# Patient Record
Sex: FEMALE | Race: WHITE | NOT HISPANIC OR LATINO | Employment: FULL TIME | URBAN - METROPOLITAN AREA
[De-identification: names, ages, dates, MRNs, and addresses within clinical notes are randomized per-mention and may not be internally consistent; named-entity substitution may affect disease eponyms.]

---

## 2017-07-19 ENCOUNTER — OFFICE VISIT (OUTPATIENT)
Dept: SPORTS MEDICINE | Facility: CLINIC | Age: 52
End: 2017-07-19
Payer: COMMERCIAL

## 2017-07-19 VITALS — TEMPERATURE: 98 F | WEIGHT: 180 LBS | BODY MASS INDEX: 31.89 KG/M2 | HEIGHT: 63 IN

## 2017-07-19 DIAGNOSIS — S46.812A TRAPEZIUS STRAIN, LEFT, INITIAL ENCOUNTER: ICD-10-CM

## 2017-07-19 DIAGNOSIS — M70.61 GREATER TROCHANTERIC BURSITIS OF RIGHT HIP: ICD-10-CM

## 2017-07-19 DIAGNOSIS — M54.2 CERVICAL SPINE PAIN: Primary | ICD-10-CM

## 2017-07-19 PROCEDURE — 20610 DRAIN/INJ JOINT/BURSA W/O US: CPT | Mod: RT,S$GLB,, | Performed by: FAMILY MEDICINE

## 2017-07-19 PROCEDURE — 99203 OFFICE O/P NEW LOW 30 MIN: CPT | Mod: 25,S$GLB,, | Performed by: FAMILY MEDICINE

## 2017-07-19 PROCEDURE — 99999 PR PBB SHADOW E&M-EST. PATIENT-LVL III: CPT | Mod: PBBFAC,,, | Performed by: FAMILY MEDICINE

## 2017-07-19 RX ORDER — MELOXICAM 15 MG/1
15 TABLET ORAL DAILY
Qty: 15 TABLET | Refills: 0 | Status: SHIPPED | OUTPATIENT
Start: 2017-07-19 | End: 2022-02-03 | Stop reason: SDUPTHER

## 2017-07-19 RX ADMIN — TRIAMCINOLONE ACETONIDE 40 MG: 40 INJECTION, SUSPENSION INTRA-ARTICULAR; INTRAMUSCULAR at 08:07

## 2017-07-19 NOTE — PROGRESS NOTES
Beverley Porter, a 52 year old female, presents today for evaluation of CERVICAL spine pain.     New patient.     History of Present Illness     Location: cervical spine  Onset: insidious,   Palliative:    Relative rest     Provocative: prolonged ambulation  Prior: none  Progression: plateau discomfort  Quality: sharp pain  Radiation: Yes  Severity: per nursing documentation  Timing: intermittent w/ use  Trauma: none    Physical Examination (PE):    Constitutional:     -Vital signs: reviewed   -General appearance: no apparent distress  [Dermatalogic] Skin:    -Inspection: No acute lesions, ulceration, or induration of the skin noted about the area evaluated   -Palpation: No subcutaneous crepitus noted about the area evaluated  Musculoskeletal (o, p, r, st): CERVICAL SPINE    There is no step off with palpation of spinous processes.  Flexion does not produce local or radicular pain.  Extension DOES produce local pain, though not radicular pain.  Spurlings test is negative to both the right and left sides  Lateral flexion/bending is symmetric and normal and does not produce local or radicular pain.  Right and left rotation is symmetric and normal and does not produce local or radicular pain.    Neruologic examination:  Strength:     BUE 5/5  Sensation is symmetric in the UE bilaterally.    Cardiovascular:   -BUE warm and well perfused   Neurologic:   -BUE sensation is appropriate     AAFP/FPM: Pocket Guide Documentation Guidelines, (c)2014    (71038=25+ bullets, 96408=81+ bullets)    Assessment:   #1 multi-level degenerative changes of the lumbar spine    No evidence of cord pathology / myelopathy  No evidence of neurologic pathology  No evidence of vascular pathology    Imaging studies reviewed:   Refused cervical spine x-rays  MRI cervical spine, O[facility] 17.03    Plan:    We discussed options including:  #1 watchful waiting  #2 physical therapy aimed at:   RoM cervical spine   Strengthening cervical spine  #3 injection  therapy:   Trigger point   CSI facet joints   CSI epidural  #4 Consultation to [facility]      The patient chooses #2 and #3 trigger point    Pain management required: handout given  Bracing required:   Physical therapy required: fPT, @ [facility], begin as above   Activity (e.g. 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    Follow up in 6-8 w  A/e fPT  A/e trigger point injection  Should symptoms worsen or fail to resolve, consider:  Revisiting the above options    Injection(s), single to multiple trigger point(s) one or two muscles (36005 + 63060):    Date/Time: concurrent with visit  Performed by: PATRICIA ZAMORA MD  Authorized by: PATRICIA ZAMORA MD  Consent Done?: Yes (Verbal)  Indications: Pain  Procedure site marked: Yes   Timeout: Prior to procedure the correct patient, procedure, and site were verified      Location: superior left trapezius  Site:   Prep: Patient was prepped and draped in usual sterile fashion   Ultrasonic Guidance for needle placement: No  Needle size: 22 G  Approach: superior posterior  Medications: 40 mg triamcinolone acetonide 40 mg/mL; 40 mg triamcinolone acetonide 40 mg/mL per injection  Patient tolerance: Patient tolerated the procedure well with no immediate complications    Procedure: Up to 1.0 milliliter(s) of a 1:1 triamcinolone:1% lidocaine was introduced into the identified trigger site(s)    References:  2016.11.10 Epic [facility] quick procedure injection template  2012 Injection Techniques In Musculoskeletal Medicine, Maty Godoy, p. 68

## 2017-07-19 NOTE — Clinical Note
July 20, 2017      [facility]            [facility]  [address]  [facility] [address]  Phone: [phone]          Patient: Beverley Porter   MR Number: [id number]   YOB: 1965   Date of Visit: 7/19/2017       Dear [facility] :    Thank you for referring Beverley Porter to me for evaluation. Attached you will find relevant portions of my assessment and plan of care.    If you have questions, please do not hesitate to call me. I look forward to following Beverley Porter along with you.    Sincerely,    Shawn Anthony MD    Enclosure  CC:  No Recipients    If you would like to receive this communication electronically, please contact [email or web address] or [phone] to request more information on [employer] access.    For providers and/or their staff who would like to refer a patient to [facility], please contact us through our one-stop-shop provider referral line, [facility], at [phone].    If you feel you have received this communication in error or would no longer like to receive these types of communications, please e-mail [email or web address]

## 2017-07-20 RX ORDER — TRIAMCINOLONE ACETONIDE 40 MG/ML
40 INJECTION, SUSPENSION INTRA-ARTICULAR; INTRAMUSCULAR
Status: DISCONTINUED | OUTPATIENT
Start: 2017-07-19 | End: 2017-07-19 | Stop reason: HOSPADM

## 2017-07-20 NOTE — PROGRESS NOTES
Beverley Porter, a 52 y.o. female, is here for evaluation of right hip.     HISTORY OF PRESENT ILLNESS   Location: lateral thigh, right  Onset: insidious, chronic  Palliative:    Relative rest   Oral analgesics     Provocative:   ADLs     Prior: none  Progression: plateau discomfort   Quality:    Sharp pain  Radiation: none  Severity: per nursing documentation  Timing: intermittent with use  Trauma: none     Review of systems (ROS):  A 10+ review of systems was performed with pertinent positives and negatives noted above in the history of present illness. Other systems were negative unless otherwise specified.      PHYSICAL EXAMINATION  General:  The patient is alert and oriented x 3.  Mood is pleasant.  Observation of ears, eyes and nose reveal no gross abnormalities.  HEENT: NCAT, sclera nonicteric  Lungs: Respirations are equal and unlabored.   Gait is coordinated. Patient can toe walk and heel walk without difficulty.    HIP/PELVIS EXAMINATION    Observation/Inspection  Gait:   Nonantalgic   Alignment:  Neutral   Scars:   None   Muscle atrophy: None   Effusion:  None   Warmth:  None   Discoloration:   None   Leg lengths:   Equal   Pelvis:   Level     Tenderness/Crepitus (T/C):      T / C  Trochanteric bursa   - / -  Piriformis    - / -  SI joint    - / -  Psoas tendon   - / -  Rectus insertion  - / -  Adductor insertion  - / -  Pubic symphysis  - / -    ROM: (* = pain)    Flexion:      120 degrees  External rotation:   40 degrees  Internal rotation with axial load:  30 degrees  Internal rotation without axial load:  40 degrees  Abduction:    45 degrees  Adduction:     20 degrees    Special Tests:  Pain w/ forced internal rotation (FADIR):  -   Pain w/ forced external rotation (DONALD):  -   Circumduction test:     -  Stinchfield test:     -   Log roll:       -   Snapping hip (internal):    -   Sit-up pain:      -   Resisted sit-up pain:     -   Resisted sit-up with adductor contraction pain:  -   Step-down  test:     +  Trendelenburg test:     -  Bridge test      +     Extremity Neuro-vascular Examination:   Sensation:  Grossly intact to light touch all dermatomal regions.   Motor Function:  Fully intact motor function at hip, knee, foot and ankle    DTRs;  quadriceps and  achilles 2+.  No clonus and downgoing Babinski.    Vascular status:  DP and PT pulses 2+, brisk capillary refill, symmetric.    Skin:  intact, compartments soft.    Other Findings:    ASSESSMENT & PLAN  Assessment:   #1 Tonnis Grade xxx osteoarthritis of hip, right   W/ greater trochanteric bursitis    No evidence of neurologic pathology  No evidence of vascular pathology    Imaging studies reviewed:   X-ray pelvis and hip, right NONE    Plan:    We discussed the importance of appropriate diet, weight, and regular exercise including quadriceps strengthening     We discussed options including:  #1 watchful waiting  #2 physical therapy aimed at:   Core stability   RoM hip   Strengthening quadriceps   Gait training   #3 injection therapy:   CSI GTB    Right,     Left,    CSI iaHip    Right,     Left,    Orthobiologics   #4 MRI for further evaluation      The patient chooses #3 csi gtb right    Pain management: handout given  Bracing:   Physical therapy:   Activity (e.g. 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

## 2017-07-20 NOTE — PROCEDURES
Large Joint Aspiration/Injection  Date/Time: 7/19/2017 8:00 AM  Performed by: PATRICIA ZAMORA  Authorized by: PATRICIA ZAMORA     Consent Done?:  Yes (Verbal)  Indications:  Pain  Procedure site marked: Yes    Timeout: Prior to procedure the correct patient, procedure, and site was verified      Location:  Hip  Site:  R greater trochanteric bursa  Prep: Patient was prepped and draped in usual sterile fashion    Ultrasonic Guidance for needle placement: No  Needle size:  22 G  Approach:  Lateral  Medications:  40 mg triamcinolone acetonide 40 mg/mL  Patient tolerance:  Patient tolerated the procedure well with no immediate complications

## 2017-07-27 ENCOUNTER — CLINICAL SUPPORT (OUTPATIENT)
Dept: REHABILITATION | Facility: HOSPITAL | Age: 52
End: 2017-07-27
Attending: FAMILY MEDICINE
Payer: COMMERCIAL

## 2017-07-27 DIAGNOSIS — M54.2 NECK PAIN: Primary | ICD-10-CM

## 2017-07-27 PROCEDURE — 97161 PT EVAL LOW COMPLEX 20 MIN: CPT | Mod: PN | Performed by: PHYSICAL THERAPIST

## 2017-07-27 RX ORDER — TRIAMCINOLONE ACETONIDE 40 MG/ML
40 INJECTION, SUSPENSION INTRA-ARTICULAR; INTRAMUSCULAR
Status: DISCONTINUED | OUTPATIENT
Start: 2017-07-19 | End: 2017-10-20

## 2017-07-27 NOTE — PROGRESS NOTES
TIME RECORD    Date: 07/27/2017    Start Time:  1530  Stop Time:  1515    OUTPATIENT PHYSICAL THERAPY   PATIENT EVALUATION  Primary Diagnosis: neck pain  Treatment Diagnosis: poor postural awareness, periscapular weakness, decreased soft tissue mobility   Past Medical History:   Diagnosis Date    Genital herpes, unspecified      Past Surgical History:   Procedure Laterality Date    DILATION AND CURETTAGE OF UTERUS      ENDOMETRIAL ABLATION       Precautions: standard  Prior Therapy: yes  Medications: Beverley Porter has a current medication list which includes the following prescription(s): citalopram, meloxicam, valacyclovir, and valacyclovir, and the following Facility-Administered Medications: triamcinolone acetonide.  Nutrition:  Normal  Prior Level of Function: Independent  Social History: ; teach 's ed  Functional Deficits Leading to Referral/Nature of Injury:   Difficulty with turning head while driving and computer work  Patient Therapy Goals: decreased pain    Subjective     Beverley Porter states onset of neck pain approximately 1-2 months with no known cause. Pain is located in back of her neck and between her shoulder blades, L side > R. She also complains of headaches at base of skull. No light or sound sensitivity. Pt was also recommended PT for hip bursitis, but requests to defer hip eval at this time. She received an injections at last 's visit and reports improvements in symptoms. No blurry vision, N/V, drop attacks.     Pain:  Location: neck   Description: Sharp  Activities Which Increase Pain: turning her head  Activities Which Decrease Pain: not driving, pain medication  Pain Scale: 0/10 at best 3/10 now  8/10 at worst    Objective     Posture: protracted and anterior tilt of scapulae, lower cervical flexed  Palpation: TTP L upper trap, levator scap, R suboccipital region and rhomboids   Sensation: BUE light touch sensation intact all dermatomes    Range of Motion/Strength:       Cervical Spine Active ROM, measured in degrees with inclinometer, * Indicates pain with movement    Flexion: 40*  Extension:40*  Left Side Bend:30  Right Side Bend:30  Left Rotation:35*  Right Rotation:30*    PROM noted to be WFL  B shoulder AROM noted to be WFL  Joint mobility: hypomobility through C7-T3 and T8-12 central PA's    MMT:    Left  Right    Shoulder:       Flexion:   5/5  5/5     Abduction:   4+/5  4+/5  External Rotation:  4+/5  5/5    Internal Rotation:  5/5  5/5    Elbow:  Flexion:    5/5  5/5  Extension:   5/5  5/5    Wrist:  Extension:   5/5  5/5    Finger:  Abd/add   5/5  5/5    Scapular:  Lower traps   4-/5  4-/5  Middle traps   4-/5  4/5  Rhomboids   4/5  4/5    Flexibility: tightness noted through upper traps, levator scap, pec minor  Gait: Without AD  Analysis: Assistance independent  Bed Mobility:Independent  Transfers: Independent  Special Tests:   Spurling's: negative  ULTT: negative    Treatment: Treatment deferred to next session secondary to pt request     Assessment       Initial Assessment Beverley is a 52 year old female referred to physical therapy for diagnosis of neck pain. Patient has the following impairments upon initial evaluation: periscapular weakness, poor postural awareness, decreased cervical spine AROM, joint stiffness, and pain limiting ADL's. Patient to benefit from skilled physical therapy to address above deficits and maximize functional independence. Patient appears motivated to improve condition and is a good candidate for physical therapy. Patient has verbalized understanding of plan of care and set goals. Patient has no identified cultural, spiritual, or educational needs that would impair learning.     History  Co-morbidities and personal factors that may impact the plan of care Examination  Body Structures and Functions, activity limitations and participation restrictions that may impact the plan of care Clinical Presentation   Decision Making/ Complexity Score    Co-morbidities:       BMI.            Personal Factors:    Body Regions:neck    Body Systems:     Musculoskeletal/ Neuromuscular:  weakness, impaired functional mobility, decreased safety awareness, pain, decreased ROM and impaired muscle length    Activity limitations: reading, driving, deskwork, turning head, looking down      Participation Restrictions: vocational tasks         stable and uncomplicated.     Low     CMS Impairment/Limitation/Restriction for FOTO Neck Survey  Status Limitation G-Code CMS Severity Modifier  Intake 58% 42% Current Status CK - At least 40 percent but less than 60 percent  Predicted 72% 28% Goal Status+ CJ - At least 20 percent but less than 40 percent    CMS Impairment/Limitation/Restriction for FOTO Hip Survey  Status Limitation G-Code CMS Severity Modifier  Intake 73% 27% Current Status CJ - At least 20 percent but less than 40 percent  Predicted 76% 24% Goal Status+ CJ - At least 20 percent but less than 40 percent    Rehab Potiential: good    Short Term Goals (4 Weeks):   1. Patient to demonstrate understanding of proper sitting posture and desk place ergonomics   2. Patient's cervical spine active rotation to improve 10 degrees or greater B  3. Patient's cervical spine active flexion to improve 10 degrees or greater  4. Patient to report decreased pain in neck by 40% or greater   Long Term Goals (8 Weeks):   1. Patient to have decreased subjective report of disability as noted by a score of 30% or less on the Neck FOTO questionnaire   2. Patient to be independent with home exercise program for improved self management of condition  3. Patient's periscapular strength to be 4+/5 or greater for improved functional ability to lift  4. Patient to report little to no difficulty turning head while driving for improved performance of vocational tasks    Plan     Certification Period: 7/27/17 to 9/27/17  Recommended Treatment Plan: 2 times per week for 8 weeks: Gait Training, Manual  Therapy, Moist Heat/ Ice, Neuromuscular Re-ed, Patient Education, Therapeutic Activites, Therapeutic Exercise and Other modalities/ dry needling PRN  Other Recommendations: Patient to start home program and told to contact therapist if there are any questions or concerns that arise prior to next scheduled visit      Therapist: Roula Frazier, PT

## 2017-08-04 ENCOUNTER — TELEPHONE (OUTPATIENT)
Dept: SPORTS MEDICINE | Facility: CLINIC | Age: 52
End: 2017-08-04

## 2017-08-07 ENCOUNTER — TELEPHONE (OUTPATIENT)
Dept: SPORTS MEDICINE | Facility: CLINIC | Age: 52
End: 2017-08-07

## 2017-08-07 PROBLEM — N95.0 POSTMENOPAUSAL BLEEDING: Status: ACTIVE | Noted: 2017-08-07

## 2017-08-07 NOTE — TELEPHONE ENCOUNTER
1st attempt.     Left voicemail.    08/22/17 appt needs to be r/s due to change in Dr. Atnhony's schedule.

## 2017-08-08 ENCOUNTER — TELEPHONE (OUTPATIENT)
Dept: SPORTS MEDICINE | Facility: CLINIC | Age: 52
End: 2017-08-08

## 2017-08-11 ENCOUNTER — TELEPHONE (OUTPATIENT)
Dept: SPORTS MEDICINE | Facility: CLINIC | Age: 52
End: 2017-08-11

## 2017-08-11 NOTE — TELEPHONE ENCOUNTER
2nd attempt.     Left voicemail.    08/22/17 appt needs to be r/s due to change in Dr. Anthony's schedule.

## 2017-08-15 ENCOUNTER — CLINICAL SUPPORT (OUTPATIENT)
Dept: REHABILITATION | Facility: HOSPITAL | Age: 52
End: 2017-08-15
Attending: FAMILY MEDICINE
Payer: COMMERCIAL

## 2017-08-15 DIAGNOSIS — M54.2 NECK PAIN: Primary | ICD-10-CM

## 2017-08-15 PROCEDURE — 97110 THERAPEUTIC EXERCISES: CPT | Mod: PN

## 2017-08-15 PROCEDURE — 97140 MANUAL THERAPY 1/> REGIONS: CPT | Mod: 59,PN

## 2017-08-15 NOTE — PROGRESS NOTES
Physical Therapy Daily Note     Name: Beverley Porter  Clinic Number: [id number]  Diagnosis:   Encounter Diagnosis   Name Primary?    Neck pain Yes     Physician: Shawn Anthony, *  Precautions: standard  Visit #: 2 of 50  PTA Visit #: 1    Time In: 1600  Time Out: 1700  Total Treatment Time: 60 minutes (1:1 with PTA 40 minutes of treatment session)    Subjective     Pt reports: Beverley states that she was scheduled to see her MD but she canceled that appointment. Patient states her hip is feeling better but her neck is still a little sore. Patient states the neck is more troublesome than the hip and she often gets pain when turning her head when driving.   Pain Scale: Beverley rates pain on a scale of 0-10 to be 3 currently.    Objective     Beverley received individual therapeutic exercises to develop strength, endurance, ROM, flexibility, posture and core stabilization for 40 minutes including:  Nu step x 7 minutes   Seated scapular retractions 2x10, 3 sec hold  Seated BUE ER 2x10, 3 sec hold  Supine AROM--cervical rotation x 2 minutes   Supine chin tucks 2x10, 3 sec hold   SL open books 1x10, 3 sec hold   Standing rows (RTB) 2x10, 3 sec hold   Standing extensions (RTB)  2x10, 3 sec hold     Beverley received the following manual therapy techniques: Myofacial release and Soft tissue Mobilization were applied to the: left upper trap for 10 minutes including:  Manual chin tucks, suboccipital release  Trigger point release to left upper trap     Beverley received moist heat to cervical spine x 10 minutes post treatment session.     Written Home Exercises Provided: Instructed patient in performance of scapular retractions 2-3 times a day.   Pt demo good understanding of the education provided. Beverley demonstrated good return demonstration of activities.     Education provided re:Beverley verbalized good understanding of education provided.   No spiritual or educational barriers to learning  provided    Assessment     Beverley tolerated treatment well today. Patient demonstrates decreased tissue mobility through proximal left upper trap with palpable trigger point and taught bands noted. Patient demonstrates ability to achieve good scapular retraction without upper trap compensation.   This is a 52 y.o. female referred to outpatient physical therapy and presents with a medical diagnosis of neck pain and demonstrates limitations as described in the problem list. Pt prognosis is Good. Pt will continue to benefit from skilled outpatient physical therapy to address the deficits listed in the problem list, provide pt/family education and to maximize pt's level of independence in the home and community environment.     Goals as follows:  Short Term Goals (4 Weeks):   1. Patient to demonstrate understanding of proper sitting posture and desk place ergonomics   2. Patient's cervical spine active rotation to improve 10 degrees or greater B  3. Patient's cervical spine active flexion to improve 10 degrees or greater  4. Patient to report decreased pain in neck by 40% or greater   Long Term Goals (8 Weeks):   1. Patient to have decreased subjective report of disability as noted by a score of 30% or less on the Neck FOTO questionnaire   2. Patient to be independent with home exercise program for improved self management of condition  3. Patient's periscapular strength to be 4+/5 or greater for improved functional ability to lift  4. Patient to report little to no difficulty turning head while driving for improved performance of vocational tasks     Plan     Continue with established Plan of Care towards PT goals.    Therapist: Balbina Sosa, PTA  8/15/2017

## 2017-09-12 ENCOUNTER — CLINICAL SUPPORT (OUTPATIENT)
Dept: REHABILITATION | Facility: HOSPITAL | Age: 52
End: 2017-09-12
Attending: FAMILY MEDICINE
Payer: COMMERCIAL

## 2017-09-12 DIAGNOSIS — M54.2 NECK PAIN: Primary | ICD-10-CM

## 2017-09-12 PROCEDURE — 97110 THERAPEUTIC EXERCISES: CPT | Mod: PN | Performed by: PHYSICAL THERAPIST

## 2017-09-12 PROCEDURE — 97140 MANUAL THERAPY 1/> REGIONS: CPT | Mod: PN | Performed by: PHYSICAL THERAPIST

## 2017-09-12 NOTE — PROGRESS NOTES
Physical Therapy Daily Note     Name: Beverley Porter  Clinic Number: [id number]  Diagnosis:   Encounter Diagnosis   Name Primary?    Neck pain Yes     Physician: Shawn Anthony, *  Precautions: standard  Visit #: 3 of 50  PTA Visit #: 0    Time In: 1430  Time Out: 1540  Total Treatment Time: 55 minutes     Subjective     Pt reports: Beverley states that her neck seems to be getting worse. She has not done the home exercise program and wants to be more consistent with coming to therapy. She complains of frequent tension headaches that start at base of neck and wrap around to temporal region.   Pain Scale: Beverley rates pain on a scale of 0-10 to be 3 currently.    Objective     Cervical Spine Active ROM, measured in degrees with inclinometer, * Indicates pain with movement    Flexion: 30*  Extension:40*  Left Side Bend:20  Right Side Bend:20  Left Rotation:40*  Right Rotation:35*    Beverley received individual therapeutic exercises to develop strength, endurance, ROM, flexibility, posture and core stabilization for 40 minutes including:  Nu step x 7 minutes - not performed   Seated upper thoracic extension hands concurrently with clapsed behind head and chin tuck 10 x  Seated scapular retractions 2x10, 3 sec hold  Seated BUE ER 2x10, 3 sec hold YTB  Supine AROM over Gianna roll--cervical rotation x 2 minutes   Supine chin tucks over gianna roll 2x10, 3 sec hold   SL open books 1x10, 3 sec hold   Standing rows (RTB) 2x10, 3 sec hold - not performed   Standing extensions (RTB)  2x10, 3 sec hold - not performed    Beverley received the following manual therapy techniques: Myofacial release and Soft tissue Mobilization were applied to the: left upper trap for 15 minutes including:  suboccipital release  Trigger point release to left upper trap   STM R rhomboids  MFR R pec minor  MET for left cspine rotation, side bending, flexion  kinesiotape Y strip to levator and I strip to upper  traps for inhibition     Beverley received moist heat to cervical spine and bolster under knees x 10 minutes post treatment session.     Written Home Exercises Provided: Instructed patient in performance of scapular retractions 2-3 times a day.   Pt demo good understanding of the education provided. Beverley demonstrated good return demonstration of activities.     Education provided re:Beverley verbalized good understanding of education provided.   No spiritual or educational barriers to learning provided    Assessment     Beverley tolerated treatment well with month reassessment performed. Pt has attended 2 treatment sessions and missed 2 visits. Pt demonstrating increased soft tissue restriction through cervical paraspinals, suboccipital region, upper traps, and levator scapulae. Decreased cervical spine AROM since initial evaluation due to pt being unable to attend physical therapy. Good response to manual techniques with improvements in soft tissue mobility and left cervical rotation following. Pt to benefit from continued skilled physical therapy to progress towards goals and maximize functional independence. This is a 52 y.o. female referred to outpatient physical therapy and presents with a medical diagnosis of neck pain and demonstrates limitations as described in the problem list. Pt prognosis is Good. Pt will continue to benefit from skilled outpatient physical therapy to address the deficits listed in the problem list, provide pt/family education and to maximize pt's level of independence in the home and community environment.     Goals as follows:  Short Term Goals (4 Weeks):   1. Patient to demonstrate understanding of proper sitting posture and desk place ergonomics (not met 9/12/2017)  2. Patient's cervical spine active rotation to improve 10 degrees or greater B(not met 9/12/2017)  3. Patient's cervical spine active flexion to improve 10 degrees or greater B(not met 9/12/2017)  4. Patient to report decreased pain in  neck by 40% or greater (not met 9/12/2017)  Long Term Goals (8 Weeks):   1. Patient to have decreased subjective report of disability as noted by a score of 30% or less on the Neck FOTO questionnaire   2. Patient to be independent with home exercise program for improved self management of condition  3. Patient's periscapular strength to be 4+/5 or greater for improved functional ability to lift  4. Patient to report little to no difficulty turning head while driving for improved performance of vocational tasks     Plan     Certification Period: 7/27/17 to 9/27/17  Continue with established Plan of Care towards PT goals.    Therapist: Roula Frazier, PT  9/12/2017

## 2017-09-15 ENCOUNTER — TELEPHONE (OUTPATIENT)
Dept: REHABILITATION | Facility: HOSPITAL | Age: 52
End: 2017-09-15

## 2017-09-19 ENCOUNTER — CLINICAL SUPPORT (OUTPATIENT)
Dept: REHABILITATION | Facility: HOSPITAL | Age: 52
End: 2017-09-19
Attending: FAMILY MEDICINE
Payer: COMMERCIAL

## 2017-09-19 DIAGNOSIS — M54.2 NECK PAIN: Primary | ICD-10-CM

## 2017-09-19 PROCEDURE — 97110 THERAPEUTIC EXERCISES: CPT | Mod: PN | Performed by: PHYSICAL THERAPIST

## 2017-09-19 PROCEDURE — 97140 MANUAL THERAPY 1/> REGIONS: CPT | Mod: PN | Performed by: PHYSICAL THERAPIST

## 2017-09-19 NOTE — PROGRESS NOTES
"                                                    Physical Therapy Daily Note     Name: Beverley Porter  Clinic Number: [id number]  Diagnosis:   Encounter Diagnosis   Name Primary?    Neck pain Yes     Physician: Shawn Anthony, *  Precautions: standard  Visit #: 4 of 50  PTA Visit #: 0    Time In: 1425  Time Out: 1530  Total Treatment Time: 55 minutes     Subjective     Pt reports: Beverley states feeling fine after last treatment session but severe pain the next 3 days. She notes that it may also be stress. Her pain is from base of head into shoulders and front of neck. She is worried something else may be going on for her to be in this much pain.  Pain is worse when turning head to the left.   Pain Scale: Beverley rates pain on a scale of 0-10 to be 5 currently.    Objective     Beverley received individual therapeutic exercises to develop strength, endurance, ROM, flexibility, posture and core stabilization for 40 minutes including:  Nu step x 7 minutes - np  Seated upper thoracic extension hands concurrently with clapsed behind head and chin tuck 10 x  Seated scapular retractions 2x10, 3 sec hold  Supine BUE ER 2x10, 3 sec hold YTB  Supine AROM over Gianna roll--cervical rotation x 2 minutes - no performed 2/2 pain  Supine chin tucks over gianna roll 2x10, 3 sec hold   Supine pec stretch over 1/2 foam roll 10 x 10"  SL open books 1x10, 3 sec hold   Prone row's x10, 3 sec hold   Prone extensions "I"  x10, 3 sec hold   UT stretch 20" x 2  LS stretch 20" x 2    Beverley received the following manual therapy techniques: Myofacial release and Soft tissue Mobilization were applied to the: left upper trap for 15 minutes including:  suboccipital release  Trigger point release to left upper trap   Cross friction/ STM rhomboids Right  Grade II/III PA central mid to lower thoracic for improved extension    Beverley received moist heat to cervical spine and bolster under knees x 10 minutes at start of treatment session     Written Home " Exercises Provided: Instructed patient in performance of scapular retractions 2-3 times a day.   Pt demo good understanding of the education provided. Beverley demonstrated good return demonstration of activities.     Education provided re:Beverley verbalized good understanding of education provided.   No spiritual or educational barriers to learning provided    Assessment     Beverley with fair tolerance to treatment session with report of cramping along medial border of right scapula with performance of exercises. Good response to manual techniques with reduction in scapular pain following. Minimal response to manual trigger point release and pt is interested in functional dry needling if conservation methods fail. Pt to benefit from continued skilled physical therapy as tolerated to progress towards goals and maximize functional independence. This is a 52 y.o. female referred to outpatient physical therapy and presents with a medical diagnosis of neck pain and demonstrates limitations as described in the problem list. Pt prognosis is Good. Pt will continue to benefit from skilled outpatient physical therapy to address the deficits listed in the problem list, provide pt/family education and to maximize pt's level of independence in the home and community environment.     Goals as follows:  Short Term Goals (4 Weeks):   1. Patient to demonstrate understanding of proper sitting posture and desk place ergonomics (not met 9/12/2017)  2. Patient's cervical spine active rotation to improve 10 degrees or greater B(not met 9/12/2017)  3. Patient's cervical spine active flexion to improve 10 degrees or greater B(not met 9/12/2017)  4. Patient to report decreased pain in neck by 40% or greater (not met 9/12/2017)  Long Term Goals (8 Weeks):   1. Patient to have decreased subjective report of disability as noted by a score of 30% or less on the Neck FOTO questionnaire   2. Patient to be independent with home exercise program for improved  self management of condition  3. Patient's periscapular strength to be 4+/5 or greater for improved functional ability to lift  4. Patient to report little to no difficulty turning head while driving for improved performance of vocational tasks     Plan     Certification Period: 7/27/17 to 9/27/17  Continue with established Plan of Care towards PT goals.    Therapist: Roula Frazier, PT  9/19/2017

## 2017-09-21 ENCOUNTER — CLINICAL SUPPORT (OUTPATIENT)
Dept: REHABILITATION | Facility: HOSPITAL | Age: 52
End: 2017-09-21
Attending: FAMILY MEDICINE
Payer: COMMERCIAL

## 2017-09-21 DIAGNOSIS — M54.2 NECK PAIN: Primary | ICD-10-CM

## 2017-09-21 PROCEDURE — 97140 MANUAL THERAPY 1/> REGIONS: CPT | Mod: PN | Performed by: PHYSICAL THERAPIST

## 2017-09-21 PROCEDURE — 97110 THERAPEUTIC EXERCISES: CPT | Mod: PN | Performed by: PHYSICAL THERAPIST

## 2017-09-21 NOTE — PROGRESS NOTES
"                                                    Physical Therapy Daily Note     Name: Beverley Porter  Clinic Number: [id number]  Diagnosis:   Encounter Diagnosis   Name Primary?    Neck pain Yes     Physician: Shawn Anthony, *  Precautions: standard  Visit #: 4 of 50  PTA Visit #: 0    Time In: 1425  Time Out: 1530  Total Treatment Time: 55 minutes     Subjective     Pt reports: Beverley states feeling fine after last treatment session but severe pain the next 3 days. She notes that it may also be stress. Her pain is from base of head into shoulders and front of neck. She is worried something else may be going on for her to be in this much pain.  Pain is worse when turning head to the left.   Pain Scale: Beverley rates pain on a scale of 0-10 to be 5 currently.    Objective     Beverley received individual therapeutic exercises to develop strength, endurance, ROM, flexibility, posture and core stabilization for 40 minutes including:  Nu step x 6 minutes   Seated upper thoracic extension hands concurrently with clapsed behind head and chin tuck 10 x  Seated scapular retractions 2x10, 3 sec hold  Supine BUE ER 2x10, 3 sec hold YTB  Supine AROM over Gianna roll--cervical rotation x 2 minutes - no performed 2/2 pain  Supine chin tucks over gianna roll 2x10, 3 sec hold   Supine pec stretch over 1/2 foam roll 10 x 10"  SL open books 1x10, 3 sec hold   Prone row's x10, 3 sec hold   Prone extensions "I"  x10, 3 sec hold   UT stretch 20" x 2  LS stretch 20" x 2    Beverley received the following manual therapy techniques: Myofacial release and Soft tissue Mobilization were applied to the: left upper trap for 15 minutes including:  suboccipital release  Trigger point release to left upper trap   Cross friction/ STM rhomboids Right  Grade II/III PA central mid to lower thoracic for improved extension    Beverley received moist heat to cervical spine and bolster under knees x 10 minutes at start of treatment session     Written Home Exercises " Provided: Instructed patient in performance of scapular retractions 2-3 times a day.   Pt demo good understanding of the education provided. Beverley demonstrated good return demonstration of activities.     Education provided re:Beverley verbalized good understanding of education provided.   No spiritual or educational barriers to learning provided    Assessment     Beverley with improved tolerance to treatment and able to perform all prescribed exercises without exacerbation of pain. Pt with increased tenderness and pain through suboccipitals, cervical paraspinals, and levator scapulae. Pt to benefit from continued skilled physical therapy as tolerated to progress towards goals and maximize functional independence. This is a 52 y.o. female referred to outpatient physical therapy and presents with a medical diagnosis of neck pain and demonstrates limitations as described in the problem list. Pt prognosis is Good. Pt will continue to benefit from skilled outpatient physical therapy to address the deficits listed in the problem list, provide pt/family education and to maximize pt's level of independence in the home and community environment.     Goals as follows:  Short Term Goals (4 Weeks):   1. Patient to demonstrate understanding of proper sitting posture and desk place ergonomics (not met 9/12/2017)  2. Patient's cervical spine active rotation to improve 10 degrees or greater B(not met 9/12/2017)  3. Patient's cervical spine active flexion to improve 10 degrees or greater B(not met 9/12/2017)  4. Patient to report decreased pain in neck by 40% or greater (not met 9/12/2017)  Long Term Goals (8 Weeks):   1. Patient to have decreased subjective report of disability as noted by a score of 30% or less on the Neck FOTO questionnaire   2. Patient to be independent with home exercise program for improved self management of condition  3. Patient's periscapular strength to be 4+/5 or greater for improved functional ability to lift  4.  Patient to report little to no difficulty turning head while driving for improved performance of vocational tasks     Plan     Certification Period: 7/27/17 to 9/27/17  Continue with established Plan of Care towards PT goals.    Therapist: Roula Frazier, PT  9/21/2017

## 2017-09-26 ENCOUNTER — TELEPHONE (OUTPATIENT)
Dept: PAIN MEDICINE | Facility: CLINIC | Age: 52
End: 2017-09-26

## 2017-09-26 ENCOUNTER — OFFICE VISIT (OUTPATIENT)
Dept: SPORTS MEDICINE | Facility: CLINIC | Age: 52
End: 2017-09-26
Payer: COMMERCIAL

## 2017-09-26 VITALS — HEIGHT: 63 IN | WEIGHT: 183 LBS | BODY MASS INDEX: 32.43 KG/M2 | TEMPERATURE: 98 F

## 2017-09-26 DIAGNOSIS — M54.2 CERVICAL SPINE PAIN: Primary | ICD-10-CM

## 2017-09-26 PROCEDURE — 99999 PR PBB SHADOW E&M-EST. PATIENT-LVL III: CPT | Mod: PBBFAC,,, | Performed by: FAMILY MEDICINE

## 2017-09-26 PROCEDURE — 99214 OFFICE O/P EST MOD 30 MIN: CPT | Mod: S$GLB,,, | Performed by: FAMILY MEDICINE

## 2017-09-26 NOTE — TELEPHONE ENCOUNTER
----- Message from Bhumika Rasmussen MA sent at 9/26/2017  4:07 PM CDT -----  Good afternoon-    Dr. Anthony is referring pt to Dr. Escoto for possible cervical spine SHARMIN. Pt has MRI schedule 10/02/17.     Please contact pt directly for scheduling.     Thank you in advance.     Bhumika Rasmussen  Clinical Assistant to Shawn Anthony MD  [facility] [place]

## 2017-09-26 NOTE — PROGRESS NOTES
Beverley Porter, a 52 year old female, presents today for evaluation of CERVICAL spine pain.     History of Present Illness     Location: cervical spine  Onset: insidious,   Palliative:    Relative rest   CSI, left superior trapezius, 07/19/17, mild improvement but not for long    fPT @ [facility], moderate compliance  Provocative: prolonged ambulation  Prior: none  Progression: plateau discomfort  Quality: sharp pain  Radiation: Yes  Severity: per nursing documentation  Timing: intermittent w/ use  Trauma: none    Physical Examination (PE):    Constitutional:     -Vital signs: reviewed   -General appearance: no apparent distress  [Dermatalogic] Skin:    -Inspection: No acute lesions, ulceration, or induration of the skin noted about the area evaluated   -Palpation: No subcutaneous crepitus noted about the area evaluated  Musculoskeletal (o, p, r, st): CERVICAL SPINE    There is no step off with palpation of spinous processes.  Flexion does not produce local or radicular pain.  Extension DOES produce local pain, though not radicular pain.  Spurlings test is negative to both the right and left sides  Lateral flexion/bending is symmetric and normal and does not produce local or radicular pain.  Right and left rotation is symmetric and normal and does not produce local or radicular pain.    Neruologic examination:  Strength:     BUE 5/5  Sensation is symmetric in the UE bilaterally.    Cardiovascular:   -BUE warm and well perfused   Neurologic:   -BUE sensation is appropriate     AAFP/FPM: Pocket Guide Documentation Guidelines, (c)2014    (30159=94+ bullets, 63811=20+ bullets)    Assessment:   #1 concern for cervical spine nerve root impingement, left > right    No evidence of cord pathology / myelopathy  No evidence of vascular pathology    Imaging studies reviewed:   X-ray spine cervical, deferred by pt    Plan:    Given extreme dysfunction and discomfort, coupled with history and physical examination suggesting cervical  disc pathology, and with non-diagnostic x-ray imaging, we will obtain MRI imaging for further evaluation of the soft tissue structures of the cervical spine.    [We discussed options including:  #1 watchful waiting  #2 continue physical therapy aimed at:   RoM cervical spine   Strengthening cervical spine stabilizers   #3 injection therapy:   Trigger point    Left superior trapezius, effective modest%, repeat   CSI facet joints   CSI epidural  #4 consultation w/ [facility]  #5 re consultation w/ [facility] re: cervical epidural  #6 consultation w/ neurology re: occipital headaches      The patient chooses #2, #5]    Pain management required: handout given  Bracing required:   Physical therapy required: fPT, @ [facility], continue as above   Activity (e.g. 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

## 2017-09-26 NOTE — TELEPHONE ENCOUNTER
Contacted and left message for patient asking for a return call to schedule a office appointment.

## 2017-10-02 ENCOUNTER — TELEPHONE (OUTPATIENT)
Dept: SPORTS MEDICINE | Facility: CLINIC | Age: 52
End: 2017-10-02

## 2017-10-02 ENCOUNTER — CLINICAL SUPPORT (OUTPATIENT)
Dept: REHABILITATION | Facility: HOSPITAL | Age: 52
End: 2017-10-02
Attending: FAMILY MEDICINE
Payer: COMMERCIAL

## 2017-10-02 ENCOUNTER — HOSPITAL ENCOUNTER (OUTPATIENT)
Dept: RADIOLOGY | Facility: HOSPITAL | Age: 52
Discharge: HOME | End: 2017-10-02
Attending: FAMILY MEDICINE
Payer: COMMERCIAL

## 2017-10-02 DIAGNOSIS — M54.2 NECK PAIN: Primary | ICD-10-CM

## 2017-10-02 DIAGNOSIS — M54.2 CERVICAL SPINE PAIN: ICD-10-CM

## 2017-10-02 PROCEDURE — 97140 MANUAL THERAPY 1/> REGIONS: CPT | Mod: PN | Performed by: PHYSICAL THERAPIST

## 2017-10-02 PROCEDURE — 97110 THERAPEUTIC EXERCISES: CPT | Mod: PN | Performed by: PHYSICAL THERAPIST

## 2017-10-02 NOTE — PROGRESS NOTES
"                                                    Physical Therapy Daily Note     Name: Beverley Porter  Clinic Number: [id number]  Diagnosis:   Encounter Diagnosis   Name Primary?    Neck pain Yes     Physician: Shawn Anthony, *  Precautions: standard  Visit #: 5 of 50  PTA Visit #: 0    Time In: 1650  Time Out: 1745  Total Treatment Time: 55 minutes     Subjective     Pt reports: Beverley states follow up with sport medicine and continued PT recommended. She is also to schedule for an MRI and pain management. She reports pain at base of her head and it shoots all the way up and down her neck. She finds herself turning to look to the side with her eyes and afraid to turn her head because it might hurt. She lost her home exercise program and requests another copy today.   Pain Scale: Beverley rates pain on a scale of 0-10 to be 5 currently.    Objective     Beverley received individual therapeutic exercises to develop strength, endurance, ROM, flexibility, posture and core stabilization for 40 minutes including:  Nu step x 6 minutes   Seated upper thoracic extension hands concurrently with clapsed behind head and chin tuck 10 x- NP  Rhomboid stretch 5 x 10"  Seated scapular retractions 2x10, 3 sec hold  Supine BUE ER 2x10, 3 sec hold RTB  Supine shoulder abduction 2 x 10 RTB  Supine AROM over Cora roll--cervical rotation x 2 minutes - not performed 2/2 pain  Seated chin tucks "L"technique 2x10, 3 sec hold   Supine pec stretch over 1/2 foam roll 10 x 10"- NP  Corner stretch 5 x 10"  SL open books 1x10, 3 sec hold   Prone row's x10, 3 sec hold - NP  Prone extensions "I"  x10, 3 sec hold -NP  UT stretch 20" x 2  LS stretch 20" x 2    Beverley received the following manual therapy techniques: Myofacial release and Soft tissue Mobilization were applied to the: left upper trap for 15 minutes including:  suboccipital release  STM upper trap, levator scap, cervical paraspinals  Manual cervical distraction  PROM all planes    Beverley " received moist heat to cervical spine and bolster under knees x 10 minutes at start of treatment session     Written Home Exercises Provided: Issued updated HEP with LS/UT stretches, chin tucks, W's, T's with RTB, open books, and corner stretch  Pt demo good understanding of the education provided. Beverley demonstrated good return demonstration of activities.     Education provided re:Beverley verbalized good understanding of education provided.   No spiritual or educational barriers to learning provided    Assessment     Beverley tolerated treatment session well. Good response to manual distraction with reduction in posterior neck pain following. Pt requiring moderate verbal cueing for correct performance of therapeutic exercises and re-educated on home exercise program for self management of condition. Pt presents to PT with referral for continued physical therapy for neck pain and certification period updated.This is a 52 y.o. female referred to outpatient physical therapy and presents with a medical diagnosis of neck pain and demonstrates limitations as described in the problem list. Pt prognosis is Good. Pt will continue to benefit from skilled outpatient physical therapy to address the deficits listed in the problem list, provide pt/family education and to maximize pt's level of independence in the home and community environment.     Goals as follows:  Short Term Goals (4 Weeks):   1. Patient to demonstrate understanding of proper sitting posture and desk place ergonomics (not met 9/12/2017)  2. Patient's cervical spine active rotation to improve 10 degrees or greater B(not met 9/12/2017)  3. Patient's cervical spine active flexion to improve 10 degrees or greater B(not met 9/12/2017)  4. Patient to report decreased pain in neck by 40% or greater (not met 9/12/2017)  Long Term Goals (8 Weeks):   1. Patient to have decreased subjective report of disability as noted by a score of 30% or less on the Neck FOTO questionnaire    2. Patient to be independent with home exercise program for improved self management of condition  3. Patient's periscapular strength to be 4+/5 or greater for improved functional ability to lift  4. Patient to report little to no difficulty turning head while driving for improved performance of vocational tasks     Plan     Updated Certification Period: 10/2/17 to 12/2/17  Continue with established Plan of Care towards PT goals.    Therapist: Roula Frazier, PT  10/2/2017

## 2017-10-02 NOTE — TELEPHONE ENCOUNTER
Left voicemail.     Asked pt to return call to [facility] to r/s c-spine MRI.     Advised pt return call to [facility] for appt [phone].    Also left pt # to [facility] [phone]

## 2017-10-04 ENCOUNTER — TELEPHONE (OUTPATIENT)
Dept: SPORTS MEDICINE | Facility: CLINIC | Age: 52
End: 2017-10-04

## 2017-10-04 NOTE — TELEPHONE ENCOUNTER
----- Message from Geri Mccoy MA sent at 10/4/2017  9:41 AM CDT -----  Contact: self   Pt was returning Bhumika thompson from yesterday in regards to scheduling MRI. [facility] MRI early appt if not Saturday early morning. Pt can be reached at [phone].

## 2017-10-12 ENCOUNTER — HOSPITAL ENCOUNTER (OUTPATIENT)
Dept: RADIOLOGY | Facility: HOSPITAL | Age: 52
Discharge: HOME | End: 2017-10-12
Attending: FAMILY MEDICINE
Payer: COMMERCIAL

## 2017-10-12 PROCEDURE — 72141 MRI NECK SPINE W/O DYE: CPT | Mod: 26,,, | Performed by: RADIOLOGY

## 2017-10-12 PROCEDURE — 72141 MRI NECK SPINE W/O DYE: CPT | Mod: TC

## 2017-10-19 ENCOUNTER — TELEPHONE (OUTPATIENT)
Dept: SPORTS MEDICINE | Facility: CLINIC | Age: 52
End: 2017-10-19

## 2017-10-20 DIAGNOSIS — M54.2 CERVICAL SPINE PAIN: Primary | ICD-10-CM

## 2017-10-20 RX ORDER — METHYLPREDNISOLONE 4 MG/1
TABLET ORAL
Qty: 1 PACKAGE | Refills: 0 | Status: SHIPPED | OUTPATIENT
Start: 2017-10-20 | End: 2017-11-10

## 2017-10-20 NOTE — TELEPHONE ENCOUNTER
"Spoke c pt.     Per Dr. Anthony, reviewed pt's MRI, informed pt medrol dose pack to be sent to pharmacy, encouraged compliance c fPT.     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     Pt asked Dr. Anthony call to speak directly to her.   "

## 2017-10-20 NOTE — TELEPHONE ENCOUNTER
----- Message from Ariana Malik sent at 10/20/2017  1:38 PM CDT -----  Contact: self/home  Pt was returning your call regarding her MRI results.

## 2017-11-02 ENCOUNTER — CLINICAL SUPPORT (OUTPATIENT)
Dept: REHABILITATION | Facility: HOSPITAL | Age: 52
End: 2017-11-02
Attending: FAMILY MEDICINE
Payer: COMMERCIAL

## 2017-11-02 DIAGNOSIS — M54.2 NECK PAIN: Primary | ICD-10-CM

## 2017-11-02 PROCEDURE — 97110 THERAPEUTIC EXERCISES: CPT | Mod: PN | Performed by: PHYSICAL THERAPIST

## 2017-11-02 PROCEDURE — 97140 MANUAL THERAPY 1/> REGIONS: CPT | Mod: PN | Performed by: PHYSICAL THERAPIST

## 2017-11-02 NOTE — PROGRESS NOTES
"                                                    Physical Therapy Daily Note     Name: Beverley Porter  Clinic Number: [id number]  Diagnosis:   No diagnosis found.  Physician: Shawn Anthony, *  Precautions: standard  Visit #: 6 of 50  PTA Visit #: 0    Time In: 1625  Time Out: 1720  Total Treatment Time: 55 minutes     Subjective     Pt reports: Beverley states having an MRI and appointment with neurologist. He recommended a change in her medication and continued physical therapy. She continues to have headaches and "shocking" pains in  She has been doing the exercises some since last in therapy. She is wanting to try anything for relief and interested in the dry needling today.   Pain Scale: Beverley rates pain on a scale of 0-10 to be 5 currently.    Objective     Cervical Spine Active ROM, measured in degrees with inclinometer, * Indicates pain with movement    Flexion: 40*  Extension:50*  Left Side Bend:20*  Right Side Bend:20*  Left Rotation:50*  Right Rotation:45*    Beverley received individual therapeutic exercises to develop strength, endurance, ROM, flexibility, posture and core stabilization for 30 minutes including:  Nu step x 0 minutes     Seated scapular retractions 3x10, 3 sec hold  Supine BUE ER 2x10, 3 sec hold RTB  Supine shoulder abduction 2 x 10 RTB  Supine AROM over Cora roll--cervical rotation x 2 minutes - not performed  Seated chin tucks "L"technique 2x10, 3 sec hold   Corner stretch 5 x 10"- np  SL open books 1x10, 3 sec hold   Prone row's x10, 3 sec hold - NP  Prone extensions "I"  x10, 3 sec hold -NP  UT stretch 20" x 2  LS stretch 20" x 2    Beverley received the following manual therapy techniques: Myofacial release and Soft tissue Mobilization were applied to the: left upper trap for 10 minutes including:  suboccipital release  STM upper trap, levator scap, cervical paraspinals  Manual cervical distraction  PROM all planes    Application of FDN: Pt educated on benefits and potential side effects of " dry needling. Educated pt on benefits, precautions, side effects followign IDN. Pt verbalized good understanding of education.  Pt signed written consent to dry needling Rx. Pt gave verbal consent for DN    Pt received dry needling to the below listed muscles using 50-60mm needles. X 10 minutes no charge  Erector spinae on occiput  C7 multifidi  Upper traps B    Beverley received moist heat to cervical spine and bolster under knees x 0 minutes at start of treatment session     Written Home Exercises Provided: Issued updated HEP with LS/UT stretches, chin tucks, W's, T's with RTB, open books, and corner stretch  Pt demo good understanding of the education provided. Beverley demonstrated good return demonstration of activities.     Education provided re:Beverley verbalized good understanding of education provided.   No spiritual or educational barriers to learning provided    Assessment     Beverley tolerated treatment session well. Pt not seen in PT for over 1 month and reassessed at start of treatment session. Pt demonstrating decreased cervical spine AROM all planes and painful. Pt with good tolerance to functional dry needling this session with improved active ROM in rotation and extension following. No adverse effects noted.This is a 52 y.o. female referred to outpatient physical therapy and presents with a medical diagnosis of neck pain and demonstrates limitations as described in the problem list. Pt prognosis is Good. Pt will continue to benefit from skilled outpatient physical therapy to address the deficits listed in the problem list, provide pt/family education and to maximize pt's level of independence in the home and community environment.     Goals as follows:  Short Term Goals (4 Weeks):   1. Patient to demonstrate understanding of proper sitting posture and desk place ergonomics (not met 9/12/2017)  2. Patient's cervical spine active rotation to improve 10 degrees or greater B(not met 9/12/2017)  3. Patient's cervical  spine active flexion to improve 10 degrees or greater B(not met 9/12/2017)  4. Patient to report decreased pain in neck by 40% or greater (not met 9/12/2017)  Long Term Goals (8 Weeks):   1. Patient to have decreased subjective report of disability as noted by a score of 30% or less on the Neck FOTO questionnaire   2. Patient to be independent with home exercise program for improved self management of condition  3. Patient's periscapular strength to be 4+/5 or greater for improved functional ability to lift  4. Patient to report little to no difficulty turning head while driving for improved performance of vocational tasks     Plan     Updated Certification Period: 10/2/17 to 12/2/17  Continue with established Plan of Care towards PT goals.    Therapist: Roula Frazier, PT  11/2/2017

## 2017-11-10 ENCOUNTER — TELEPHONE (OUTPATIENT)
Dept: REHABILITATION | Facility: HOSPITAL | Age: 52
End: 2017-11-10

## 2018-07-12 ENCOUNTER — HOSPITAL ENCOUNTER (EMERGENCY)
Facility: HOSPITAL | Age: 53
Discharge: HOME | End: 2018-07-13
Attending: EMERGENCY MEDICINE
Payer: COMMERCIAL

## 2018-07-12 DIAGNOSIS — R11.0 NAUSEA: ICD-10-CM

## 2018-07-12 DIAGNOSIS — N39.0 URINARY TRACT INFECTION WITH HEMATURIA, SITE UNSPECIFIED: ICD-10-CM

## 2018-07-12 DIAGNOSIS — R51.9 ACUTE NONINTRACTABLE HEADACHE, UNSPECIFIED HEADACHE TYPE: Primary | ICD-10-CM

## 2018-07-12 DIAGNOSIS — R31.9 URINARY TRACT INFECTION WITH HEMATURIA, SITE UNSPECIFIED: ICD-10-CM

## 2018-07-12 DIAGNOSIS — R03.0 ELEVATED BLOOD PRESSURE READING: ICD-10-CM

## 2018-07-12 LAB
ALBUMIN SERPL BCP-MCNC: 4.3 G/DL
ALP SERPL-CCNC: 128 U/L
ALT SERPL W/O P-5'-P-CCNC: 57 U/L
ANION GAP SERPL CALC-SCNC: 11 MMOL/L
AST SERPL-CCNC: 37 U/L
B-HCG UR QL: NEGATIVE
BACTERIA #/AREA URNS HPF: ABNORMAL /HPF
BASOPHILS # BLD AUTO: 0.04 K/UL
BASOPHILS NFR BLD: 0.5 %
BILIRUB SERPL-MCNC: 0.6 MG/DL
BILIRUB UR QL STRIP: NEGATIVE
BUN SERPL-MCNC: 14 MG/DL
CALCIUM SERPL-MCNC: 9.7 MG/DL
CHLORIDE SERPL-SCNC: 102 MMOL/L
CLARITY UR: CLEAR
CO2 SERPL-SCNC: 26 MMOL/L
COLOR UR: YELLOW
CREAT SERPL-MCNC: 1 MG/DL
CTP QC/QA: YES
DIFFERENTIAL METHOD: ABNORMAL
EOSINOPHIL # BLD AUTO: 0.1 K/UL
EOSINOPHIL NFR BLD: 0.8 %
ERYTHROCYTE [DISTWIDTH] IN BLOOD BY AUTOMATED COUNT: 12.8 %
EST. GFR  (AFRICAN AMERICAN): >60 ML/MIN/1.73 M^2
EST. GFR  (NON AFRICAN AMERICAN): >60 ML/MIN/1.73 M^2
GLUCOSE SERPL-MCNC: 149 MG/DL
GLUCOSE UR QL STRIP: NEGATIVE
HCT VFR BLD AUTO: 41.7 %
HGB BLD-MCNC: 14.8 G/DL
HGB UR QL STRIP: ABNORMAL
KETONES UR QL STRIP: ABNORMAL
LEUKOCYTE ESTERASE UR QL STRIP: ABNORMAL
LIPASE SERPL-CCNC: 11 U/L
LYMPHOCYTES # BLD AUTO: 1.1 K/UL
LYMPHOCYTES NFR BLD: 12.5 %
MCH RBC QN AUTO: 31.2 PG
MCHC RBC AUTO-ENTMCNC: 35.5 G/DL
MCV RBC AUTO: 88 FL
MICROSCOPIC COMMENT: ABNORMAL
MONOCYTES # BLD AUTO: 0.3 K/UL
MONOCYTES NFR BLD: 3.9 %
NEUTROPHILS # BLD AUTO: 7.1 K/UL
NEUTROPHILS NFR BLD: 82.2 %
NITRITE UR QL STRIP: NEGATIVE
PH UR STRIP: 6 [PH] (ref 5–8)
PLATELET # BLD AUTO: 270 K/UL
PMV BLD AUTO: 9.8 FL
POTASSIUM SERPL-SCNC: 4 MMOL/L
PROT SERPL-MCNC: 8 G/DL
PROT UR QL STRIP: NEGATIVE
RBC # BLD AUTO: 4.74 M/UL
RBC #/AREA URNS HPF: 3 /HPF (ref 0–4)
SODIUM SERPL-SCNC: 139 MMOL/L
SP GR UR STRIP: 1.01 (ref 1–1.03)
URN SPEC COLLECT METH UR: ABNORMAL
UROBILINOGEN UR STRIP-ACNC: ABNORMAL EU/DL
WBC # BLD AUTO: 8.69 K/UL
WBC #/AREA URNS HPF: 8 /HPF (ref 0–5)

## 2018-07-12 PROCEDURE — 99284 EMERGENCY DEPT VISIT MOD MDM: CPT | Mod: 25

## 2018-07-12 PROCEDURE — 96365 THER/PROPH/DIAG IV INF INIT: CPT

## 2018-07-12 PROCEDURE — 81000 URINALYSIS NONAUTO W/SCOPE: CPT

## 2018-07-12 PROCEDURE — 96375 TX/PRO/DX INJ NEW DRUG ADDON: CPT

## 2018-07-12 PROCEDURE — 96361 HYDRATE IV INFUSION ADD-ON: CPT

## 2018-07-12 PROCEDURE — 87086 URINE CULTURE/COLONY COUNT: CPT

## 2018-07-12 PROCEDURE — 63600175 PHARM REV CODE 636 W HCPCS: Performed by: NURSE PRACTITIONER

## 2018-07-12 PROCEDURE — 85025 COMPLETE CBC W/AUTO DIFF WBC: CPT

## 2018-07-12 PROCEDURE — 83690 ASSAY OF LIPASE: CPT

## 2018-07-12 PROCEDURE — 25000003 PHARM REV CODE 250: Performed by: NURSE PRACTITIONER

## 2018-07-12 PROCEDURE — 81025 URINE PREGNANCY TEST: CPT | Performed by: NURSE PRACTITIONER

## 2018-07-12 PROCEDURE — 80053 COMPREHEN METABOLIC PANEL: CPT

## 2018-07-12 RX ORDER — ONDANSETRON 2 MG/ML
4 INJECTION INTRAMUSCULAR; INTRAVENOUS
Status: COMPLETED | OUTPATIENT
Start: 2018-07-12 | End: 2018-07-12

## 2018-07-12 RX ORDER — DULOXETIN HYDROCHLORIDE 20 MG/1
20 CAPSULE, DELAYED RELEASE ORAL DAILY
COMMUNITY
End: 2022-11-03 | Stop reason: DRUGHIGH

## 2018-07-12 RX ORDER — ONDANSETRON 4 MG/1
4 TABLET, ORALLY DISINTEGRATING ORAL
Status: COMPLETED | OUTPATIENT
Start: 2018-07-12 | End: 2018-07-12

## 2018-07-12 RX ADMIN — ONDANSETRON 4 MG: 2 INJECTION INTRAMUSCULAR; INTRAVENOUS at 10:07

## 2018-07-12 RX ADMIN — ONDANSETRON 4 MG: 4 TABLET, ORALLY DISINTEGRATING ORAL at 10:07

## 2018-07-12 RX ADMIN — IOHEXOL 80 ML: 350 INJECTION, SOLUTION INTRAVENOUS at 11:07

## 2018-07-12 RX ADMIN — SODIUM CHLORIDE 1000 ML: 0.9 INJECTION, SOLUTION INTRAVENOUS at 10:07

## 2018-07-13 VITALS
TEMPERATURE: 98 F | OXYGEN SATURATION: 96 % | HEIGHT: 65 IN | WEIGHT: 180 LBS | HEART RATE: 95 BPM | DIASTOLIC BLOOD PRESSURE: 79 MMHG | RESPIRATION RATE: 20 BRPM | BODY MASS INDEX: 29.99 KG/M2 | SYSTOLIC BLOOD PRESSURE: 137 MMHG

## 2018-07-13 PROCEDURE — 25000003 PHARM REV CODE 250: Performed by: NURSE PRACTITIONER

## 2018-07-13 PROCEDURE — 63600175 PHARM REV CODE 636 W HCPCS: Performed by: NURSE PRACTITIONER

## 2018-07-13 PROCEDURE — 25500020 PHARM REV CODE 255: Performed by: NURSE PRACTITIONER

## 2018-07-13 RX ORDER — BUTALBITAL, ACETAMINOPHEN AND CAFFEINE 50; 325; 40 MG/1; MG/1; MG/1
1 TABLET ORAL
Status: COMPLETED | OUTPATIENT
Start: 2018-07-13 | End: 2018-07-13

## 2018-07-13 RX ORDER — KETOROLAC TROMETHAMINE 30 MG/ML
15 INJECTION, SOLUTION INTRAMUSCULAR; INTRAVENOUS
Status: COMPLETED | OUTPATIENT
Start: 2018-07-13 | End: 2018-07-13

## 2018-07-13 RX ORDER — BUTALBITAL, ACETAMINOPHEN AND CAFFEINE 50; 325; 40 MG/1; MG/1; MG/1
1 TABLET ORAL EVERY 6 HOURS PRN
Qty: 12 TABLET | Refills: 0 | Status: SHIPPED | OUTPATIENT
Start: 2018-07-13 | End: 2018-08-12

## 2018-07-13 RX ORDER — ONDANSETRON 4 MG/1
4 TABLET, ORALLY DISINTEGRATING ORAL EVERY 6 HOURS PRN
Qty: 20 TABLET | Refills: 0 | Status: SHIPPED | OUTPATIENT
Start: 2018-07-13 | End: 2022-03-17

## 2018-07-13 RX ORDER — PROMETHAZINE HYDROCHLORIDE 25 MG/1
25 SUPPOSITORY RECTAL EVERY 6 HOURS PRN
Qty: 5 SUPPOSITORY | Refills: 0 | Status: SHIPPED | OUTPATIENT
Start: 2018-07-13 | End: 2022-03-17

## 2018-07-13 RX ORDER — CEPHALEXIN 500 MG/1
500 CAPSULE ORAL EVERY 12 HOURS
Qty: 20 CAPSULE | Refills: 0 | Status: SHIPPED | OUTPATIENT
Start: 2018-07-13 | End: 2018-07-23

## 2018-07-13 RX ADMIN — BUTALBITAL, ACETAMINOPHEN AND CAFFEINE 1 TABLET: 50; 325; 40 TABLET ORAL at 12:07

## 2018-07-13 RX ADMIN — PROMETHAZINE HYDROCHLORIDE 12.5 MG: 25 INJECTION INTRAMUSCULAR; INTRAVENOUS at 01:07

## 2018-07-13 RX ADMIN — KETOROLAC TROMETHAMINE 15 MG: 30 INJECTION, SOLUTION INTRAMUSCULAR at 12:07

## 2018-07-13 NOTE — ED PROVIDER NOTES
"Encounter Date: 7/12/2018    This is a SORT/MSE of a 53 y.o. female presenting to the ED with c/o frontal headache and right sided abdominal pain/flank pain. Reports nausea.  Pertinent exam findings remarkable for tenderness to palpation right abdomen, gradual onset headache which began this morning, negative photophobia/phonophobia, negative for visual disturbances.  Patient is speaking in clear sentences with ease, steady gait without difficulty, neurologically intact at this time. Care will be transferred to an alternate provider when patient is roomed for a full evaluation and final disposition. GRAEME Ramirez, MARYP-C 7/12/2018 10:04 PM    SCRIBE #1 NOTE: I, Nitin Bailey, am scribing for, and in the presence of,  Paul Wilhelm NP. I have scribed the following portions of the note - Other sections scribed: HPI, ROS.       History     Chief Complaint   Patient presents with    Abdominal Pain     pt here with right sided abdominal pain that began yesterday, pain is constant. c/o nausea, wihtout vomiting.     Headache     c/o headache; no hx of migraines     CC: Abdominal Pain ; Headache    HPI: 52 y/o female with hx of genital herpes presents to the ED c/o acute, severe (9/10) RUQ abdominal pain and a fontal HA that began yesterday. Pt states she has hx of HA but this one is different because "I cant get it to go away". Pt notes use of ibuprofen without relief. Pt is also c/o nausea and chills. Pt denies fever, vomiting, diarrhea, chest pain, SOB, or any other associated symptoms.       The history is provided by the patient and the spouse. No  was used.     Review of patient's allergies indicates:  No Known Allergies  Past Medical History:   Diagnosis Date    Genital herpes, unspecified     Hypertension      Past Surgical History:   Procedure Laterality Date    DILATION AND CURETTAGE OF UTERUS      ENDOMETRIAL ABLATION      FOOT SURGERY       Family History   Problem Relation Age of Onset "    Breast cancer Paternal Grandmother     Colon cancer Neg Hx     Ovarian cancer Neg Hx      Social History   Substance Use Topics    Smoking status: Never Smoker    Smokeless tobacco: Never Used    Alcohol use No     Review of Systems   Constitutional: Positive for chills. Negative for fever.   HENT: Negative for congestion, ear pain, postnasal drip, rhinorrhea and sore throat.    Eyes: Negative for pain.   Respiratory: Negative for cough and shortness of breath.    Cardiovascular: Negative for chest pain.   Gastrointestinal: Positive for abdominal pain (RUQ) and nausea. Negative for diarrhea and vomiting.   Genitourinary: Negative for dysuria, flank pain, hematuria and urgency.   Musculoskeletal: Negative for back pain and neck pain.   Skin: Negative for rash.   Neurological: Positive for headaches. Negative for weakness.       Physical Exam     Initial Vitals [07/12/18 2202]   BP Pulse Resp Temp SpO2   (!) 184/87 98 18 99.4 °F (37.4 °C) 95 %      MAP       --         Physical Exam    Nursing note and vitals reviewed.  Constitutional: She appears well-developed and well-nourished. She is not diaphoretic. No distress.   HENT:   Head: Normocephalic and atraumatic.   Right Ear: External ear normal.   Left Ear: External ear normal.   Nose: Nose normal.   Eyes: Conjunctivae and EOM are normal. Pupils are equal, round, and reactive to light. Right eye exhibits no discharge. Left eye exhibits no discharge. No scleral icterus.   Neck: Trachea normal, normal range of motion, full passive range of motion without pain and phonation normal. Neck supple. No thyromegaly present. No spinous process tenderness and no muscular tenderness present. No tracheal deviation and normal range of motion present. No neck rigidity. No Brudzinski's sign and no Kernig's sign noted. No JVD present.   Neck is supple with full nonpainful active and passive range of motion. Negative Brudzinski's and Kernig's. No meningismus.    Cardiovascular: Normal rate, regular rhythm, normal heart sounds and intact distal pulses. Exam reveals no gallop and no friction rub.    No murmur heard.  Pulmonary/Chest: Breath sounds normal. No stridor. No respiratory distress. She has no wheezes. She has no rhonchi. She has no rales.   Abdominal: Soft. Bowel sounds are normal. She exhibits no distension and no mass. There is tenderness in the right upper quadrant and right lower quadrant. There is tenderness at McBurney's point. There is no rigidity, no rebound, no guarding, no CVA tenderness and negative Reilly's sign.   Mild right lower quadrant and right upper quadrant abdominal tenderness. Negative Reilly sign. No rigidity or guarding.  Abdomen is soft.  No rebound tenderness. No peritoneal signs.   Musculoskeletal: Normal range of motion. She exhibits no edema or tenderness.   Lymphadenopathy:     She has no cervical adenopathy.   Neurological: She is alert and oriented to person, place, and time. She has normal strength and normal reflexes. She displays normal reflexes. No cranial nerve deficit or sensory deficit. She displays a negative Romberg sign. Coordination and gait normal. GCS eye subscore is 4. GCS verbal subscore is 5. GCS motor subscore is 6.   AAO x3. GCS 15.  No focal neuro deficits.  CN 2 through 12 grossly intact.  Ambulating with a steady gait without difficulty.  Negative Romberg.  No dysmetria with finger-nose-finger exam.   Skin: Skin is warm and dry. No rash and no abscess noted. No erythema. No pallor.   Psychiatric: She has a normal mood and affect. Her behavior is normal. Judgment and thought content normal.         ED Course   Procedures  Labs Reviewed   CBC W/ AUTO DIFFERENTIAL - Abnormal; Notable for the following:        Result Value    MCH 31.2 (*)     Gran% 82.2 (*)     Lymph% 12.5 (*)     Mono% 3.9 (*)     All other components within normal limits   COMPREHENSIVE METABOLIC PANEL - Abnormal; Notable for the following:      Glucose 149 (*)     ALT 57 (*)     All other components within normal limits   URINALYSIS - Abnormal; Notable for the following:     Ketones, UA Trace (*)     Occult Blood UA 1+ (*)     Urobilinogen, UA 2.0-3.0 (*)     Leukocytes, UA 2+ (*)     All other components within normal limits   URINALYSIS MICROSCOPIC - Abnormal; Notable for the following:     WBC, UA 8 (*)     Bacteria, UA Moderate (*)     All other components within normal limits   CULTURE, URINE   LIPASE   POCT URINE PREGNANCY          Imaging Results          CT Abdomen Pelvis With Contrast (Final result)  Result time 07/12/18 23:33:30    Final result by Kiet Juarez MD (07/12/18 23:33:30)                 Impression:      No acute findings in the abdomen or pelvis.    Mild nonspecific prominence of the endometrial canal, possibly related to blood products.  Recommend correlation with nonemergent pelvic ultrasound/gynecologic follow-up if the patient is postmenopausal and has experienced abnormal vaginal bleeding to exclude malignancy.      Electronically signed by: Kiet Juarez MD  Date:    07/12/2018  Time:    23:33             Narrative:    EXAMINATION:  CT ABDOMEN PELVIS WITH CONTRAST    CLINICAL HISTORY:  Abdominal pain, unspecified;    TECHNIQUE:  Low dose axial images, sagittal and coronal reformations were obtained from the lung bases to the pubic symphysis following the IV administration of 80 mL of Omnipaque 350 .  Oral contrast was not given. Postcontrast images were also obtained in the delayed phase.    COMPARISON:  None.    FINDINGS:  Lower Chest:    Lung bases are clear.  Heart size is normal.    Abdomen:    Liver is normal in size and contour.  There is a subcentimeter well-defined hypodensity in hepatic segment 4, too small to definitively characterize, but likely a simple cyst.  Gallbladder is unremarkable. No intrahepatic biliary ductal dilatation.    Spleen, adrenals, and pancreas are unremarkable.    The kidneys are  symmetric.  No hydronephrosis.    No small bowel obstruction.  No inflammatory changes identified involving the gastrointestinal tract.  Appendix is normal.    No pneumoperitoneum or organized fluid collection.    No bulky lymphadenopathy.    Abdominal aorta is normal in caliber.    Portal, splenic, and superior mesenteric veins are patent.    Pelvis:    Urinary bladder is thin walled.  Uterus demonstrates mildly thickened fluid filled appearance of the endometrium, measuring up to 1.5 cm in thickness.  No adnexal masses.  No significant pelvic free fluid.  No pelvic lymphadenopathy.    Bones and soft tissues:    No aggressive osseous lesions.  Minor lumbar spine degenerative changes.                               CT Head Without Contrast (Final result)  Result time 07/12/18 23:23:21    Final result by Kiet Juarez MD (07/12/18 23:23:21)                 Impression:      No acute intracranial abnormality.      Electronically signed by: Kiet Juarez MD  Date:    07/12/2018  Time:    23:23             Narrative:    EXAMINATION:  CT HEAD WITHOUT CONTRAST    CLINICAL HISTORY:  Headache, acute, norm neuro exam;    TECHNIQUE:  Low dose axial images were obtained through the head.  Coronal and sagittal reformations were also performed. Contrast was not administered.    COMPARISON:  None.    FINDINGS:  No evidence of acute territorial infarct, hemorrhage, mass effect, or midline shift.    Ventricles are normal in size and configuration.    No displaced calvarial fracture.    There is mucosal thickening in the bilateral maxillary sinuses, right side greater than left.  Otherwise, the visualized paranasal sinuses and mastoid air cells are clear.                                 Medical Decision Making:   Differential Diagnosis:   Includes intracranial hemorrhage, intracranial neoplasm, CVA, meningitis, appendicitis, cholecystitis, diverticulitis, colitis, bowel obstruction, bowel perforation, others  Clinical Tests:    Lab Tests: Reviewed and Ordered  Radiological Study: Ordered and Reviewed  ED Management:  53-year-old nontoxic female presenting for evaluation of abdominal pain, headache, and nausea.  Abdominal pain is in the right lower and upper quadrants.  Abdominal pain and nausea began yesterday.  Headache is frontal and began earlier this morning and has been constant.  She denies vomiting, diarrhea, chest pain, shortness of breath, fever, or any additional symptoms.  Vitals are stable. She is afebrile and in no distress. No focal neuro deficits.  CN 2 through 12 grossly intact. Ambulating with a steady gait without difficulty.  Neck is supple with full nonpainful active and passive range of motion. No meningismus. Very mild of a nonfocal right upper quadrant and right lower quadrant tenderness to palpation. Abdomen is soft.  No rigidity, guarding, rebound tenderness.  No peritoneal signs.  Doubt surgical abdomen.  CBC, CMP, and lipase unremarkable.  Urinalysis shows evidence of possible UTI.  Urine culture in process.  Will treat empirically with Keflex.  CT of the abdomen pelvis shows no acute abnormality attributable to the patient's symptoms.  Mild nonspecific prominence of the endometrial canal is an incidental finding.  CT of the head shows no evidence of acute abnormality including no evidence of intracranial hemorrhage, neoplasm, infarct, or mass effect.  Treated with Zofran and a normal saline bolus.  His treatment failed to control the patient's nausea.  Ordered Phenergan which relieved nausea.  Treated headache with Toradol and Fioricet after which pain is completely resolved.  Uncertain etiology of patient's symptoms, however given the above I doubt emergent pathology.  Possible viral syndrome.  Prescribed Keflex, Phenergan, Zofran, and Fioricet at discharge. Advised patient to follow up with her PCP for re-evaluation and further management.  ED return precautions given.  Patient and her  expressed  understanding of diagnosis, discharge instructions, return precautions.  Other:   I have discussed this case with another health care provider.       <> Summary of the Discussion: Case discussed with my attending physician who also evaluated the patient and agreed with the assessment and plan.            Scribe Attestation:   Scribe #1: I performed the above scribed service and the documentation accurately describes the services I performed. I attest to the accuracy of the note.    Attending Attestation:           Physician Attestation for Scribe:  Physician Attestation Statement for Scribe #1: I, Paul Wilhelm NP, reviewed documentation, as scribed by Nitin Bailey in my presence, and it is both accurate and complete.                    Clinical Impression:   The primary encounter diagnosis was Acute nonintractable headache, unspecified headache type. Diagnoses of Urinary tract infection with hematuria, site unspecified, Nausea, and Elevated blood pressure reading were also pertinent to this visit.      Disposition:   Disposition: Discharged  Condition: Stable                        Paul Wilhelm NP  07/13/18 0515

## 2018-07-13 NOTE — ED TRIAGE NOTES
Pt c/o right sided flank pain and abdominal pain x2 days, frontal headache x1 day. Describes headache as throbbing, 9/10. Also c/o nausea. Denies vomiting, diarrhea, constipation. LBM today. Pt took ibuprofen PTA to no relief

## 2018-07-13 NOTE — DISCHARGE INSTRUCTIONS
Take antibiotics for UTI twice daily for 10 days until they are gone even if your symptoms improve.    Take nausea medication as needed only as prescribed.  Take Fioricet for headaches as needed only as prescribed.  Do not drink alcohol or drive when taking this medication.    Follow-up with your primary physician for re-evaluation and further management.    Return to the emergency department for any new or worsening symptoms or as needed.

## 2018-07-14 LAB — BACTERIA UR CULT: NORMAL

## 2022-02-03 ENCOUNTER — OFFICE VISIT (OUTPATIENT)
Dept: SPORTS MEDICINE | Facility: CLINIC | Age: 57
End: 2022-02-03
Payer: COMMERCIAL

## 2022-02-03 VITALS
DIASTOLIC BLOOD PRESSURE: 92 MMHG | BODY MASS INDEX: 24.83 KG/M2 | HEIGHT: 65 IN | SYSTOLIC BLOOD PRESSURE: 150 MMHG | WEIGHT: 149 LBS

## 2022-02-03 DIAGNOSIS — M99.08 SOMATIC DYSFUNCTION OF RIB CAGE REGION: ICD-10-CM

## 2022-02-03 DIAGNOSIS — M99.01 CERVICAL (NECK) REGION SOMATIC DYSFUNCTION: ICD-10-CM

## 2022-02-03 DIAGNOSIS — R29.3 POOR POSTURE: ICD-10-CM

## 2022-02-03 DIAGNOSIS — M99.00 SOMATIC DYSFUNCTION OF HEAD REGION: ICD-10-CM

## 2022-02-03 DIAGNOSIS — M54.2 CERVICALGIA: Primary | ICD-10-CM

## 2022-02-03 DIAGNOSIS — G44.209 TENSION HEADACHE: ICD-10-CM

## 2022-02-03 DIAGNOSIS — M99.07 UPPER EXTREMITY SOMATIC DYSFUNCTION: ICD-10-CM

## 2022-02-03 DIAGNOSIS — M99.02 SOMATIC DYSFUNCTION OF THORACIC REGION: ICD-10-CM

## 2022-02-03 DIAGNOSIS — R03.0 ELEVATED BLOOD PRESSURE READING IN OFFICE WITHOUT DIAGNOSIS OF HYPERTENSION: ICD-10-CM

## 2022-02-03 DIAGNOSIS — M79.10 MYALGIA: ICD-10-CM

## 2022-02-03 DIAGNOSIS — M47.812 CERVICAL SPONDYLOSIS: ICD-10-CM

## 2022-02-03 PROCEDURE — 1159F PR MEDICATION LIST DOCUMENTED IN MEDICAL RECORD: ICD-10-PCS | Mod: CPTII,S$GLB,, | Performed by: NEUROMUSCULOSKELETAL MEDICINE & OMM

## 2022-02-03 PROCEDURE — 3077F PR MOST RECENT SYSTOLIC BLOOD PRESSURE >= 140 MM HG: ICD-10-PCS | Mod: CPTII,S$GLB,, | Performed by: NEUROMUSCULOSKELETAL MEDICINE & OMM

## 2022-02-03 PROCEDURE — 99204 PR OFFICE/OUTPT VISIT, NEW, LEVL IV, 45-59 MIN: ICD-10-PCS | Mod: 25,S$GLB,, | Performed by: NEUROMUSCULOSKELETAL MEDICINE & OMM

## 2022-02-03 PROCEDURE — 3080F DIAST BP >= 90 MM HG: CPT | Mod: CPTII,S$GLB,, | Performed by: NEUROMUSCULOSKELETAL MEDICINE & OMM

## 2022-02-03 PROCEDURE — 3008F PR BODY MASS INDEX (BMI) DOCUMENTED: ICD-10-PCS | Mod: CPTII,S$GLB,, | Performed by: NEUROMUSCULOSKELETAL MEDICINE & OMM

## 2022-02-03 PROCEDURE — 1160F RVW MEDS BY RX/DR IN RCRD: CPT | Mod: CPTII,S$GLB,, | Performed by: NEUROMUSCULOSKELETAL MEDICINE & OMM

## 2022-02-03 PROCEDURE — 97110 THERAPEUTIC EXERCISES: CPT | Mod: S$GLB,,, | Performed by: NEUROMUSCULOSKELETAL MEDICINE & OMM

## 2022-02-03 PROCEDURE — 3077F SYST BP >= 140 MM HG: CPT | Mod: CPTII,S$GLB,, | Performed by: NEUROMUSCULOSKELETAL MEDICINE & OMM

## 2022-02-03 PROCEDURE — 1160F PR REVIEW ALL MEDS BY PRESCRIBER/CLIN PHARMACIST DOCUMENTED: ICD-10-PCS | Mod: CPTII,S$GLB,, | Performed by: NEUROMUSCULOSKELETAL MEDICINE & OMM

## 2022-02-03 PROCEDURE — 97110 PR THERAPEUTIC EXERCISES: ICD-10-PCS | Mod: S$GLB,,, | Performed by: NEUROMUSCULOSKELETAL MEDICINE & OMM

## 2022-02-03 PROCEDURE — 99204 OFFICE O/P NEW MOD 45 MIN: CPT | Mod: 25,S$GLB,, | Performed by: NEUROMUSCULOSKELETAL MEDICINE & OMM

## 2022-02-03 PROCEDURE — 99999 PR PBB SHADOW E&M-NEW PATIENT-LVL III: CPT | Mod: PBBFAC,,, | Performed by: NEUROMUSCULOSKELETAL MEDICINE & OMM

## 2022-02-03 PROCEDURE — 98927 OSTEOPATH MANJ 5-6 REGIONS: CPT | Mod: S$GLB,,, | Performed by: NEUROMUSCULOSKELETAL MEDICINE & OMM

## 2022-02-03 PROCEDURE — 1159F MED LIST DOCD IN RCRD: CPT | Mod: CPTII,S$GLB,, | Performed by: NEUROMUSCULOSKELETAL MEDICINE & OMM

## 2022-02-03 PROCEDURE — 3008F BODY MASS INDEX DOCD: CPT | Mod: CPTII,S$GLB,, | Performed by: NEUROMUSCULOSKELETAL MEDICINE & OMM

## 2022-02-03 PROCEDURE — 3080F PR MOST RECENT DIASTOLIC BLOOD PRESSURE >= 90 MM HG: ICD-10-PCS | Mod: CPTII,S$GLB,, | Performed by: NEUROMUSCULOSKELETAL MEDICINE & OMM

## 2022-02-03 PROCEDURE — 98927 PR OSTEOPATHIC MANIP,5-6 BODY REGN: ICD-10-PCS | Mod: S$GLB,,, | Performed by: NEUROMUSCULOSKELETAL MEDICINE & OMM

## 2022-02-03 PROCEDURE — 99999 PR PBB SHADOW E&M-NEW PATIENT-LVL III: ICD-10-PCS | Mod: PBBFAC,,, | Performed by: NEUROMUSCULOSKELETAL MEDICINE & OMM

## 2022-02-03 RX ORDER — LETROZOLE 2.5 MG/1
2.5 TABLET, FILM COATED ORAL DAILY
COMMUNITY

## 2022-02-03 RX ORDER — MELOXICAM 7.5 MG/1
7.5 TABLET ORAL DAILY
Qty: 30 TABLET | Refills: 0 | Status: SHIPPED | OUTPATIENT
Start: 2022-02-03 | End: 2022-03-17 | Stop reason: SDUPTHER

## 2022-02-03 NOTE — PROGRESS NOTES
Subjective:     Beverley Porter    Chief Complaint   Patient presents with    Neck - Pain       HPI    Beverley is coming in today for neck pain that began years ago, acutely worsening over the past few months. Pt. describes the pain as a 3/10 achy pain that does radiate to her occiput. There was not a fall/injury/ or trauma associated with the onset of symptoms. The pain is better with rest and worse with sitting, turning head. Pt reports she has tried to change her pillow without relief. Notes her pain is much worse at night. Denies any radicular pain to UE. Pt also reports intermittent LBP. Pt. Denies any other musculoskeletal complaints at this time. Pt. Has repeat cervical spine x-rays about 6 months ago by her PCP (at [facility] [place])    Review of Systems   Constitutional: Negative for chills and fever.   HENT: Negative for hearing loss and tinnitus.    Eyes: Negative for blurred vision and photophobia.   Respiratory: Negative for cough and shortness of breath.    Cardiovascular: Negative for chest pain and leg swelling.   Gastrointestinal: Negative for abdominal pain, heartburn, nausea and vomiting.   Genitourinary: Negative for dysuria and hematuria.   Musculoskeletal: Positive for myalgias and neck pain. Negative for back pain, falls and joint pain.   Skin: Negative for rash.   Neurological: Negative for dizziness, tingling, focal weakness, weakness and headaches.   Endo/Heme/Allergies: Negative for environmental allergies. Does not bruise/bleed easily.   Psychiatric/Behavioral: Negative for depression. The patient is not nervous/anxious.          PAST MEDICAL HISTORY:   Past Medical History:   Diagnosis Date    Genital herpes, unspecified     Hypertension      PAST SURGICAL HISTORY:   Past Surgical History:   Procedure Laterality Date    DILATION AND CURETTAGE OF UTERUS      ENDOMETRIAL ABLATION      FOOT SURGERY       FAMILY HISTORY:   Family History   Problem Relation Age of Onset    Breast cancer Paternal  Grandmother     Colon cancer Neg Hx     Ovarian cancer Neg Hx      SOCIAL HISTORY:   Social History     Socioeconomic History    Marital status:    Tobacco Use    Smoking status: Never Smoker    Smokeless tobacco: Never Used   Substance and Sexual Activity    Alcohol use: No       MEDICATIONS:   Current Outpatient Medications:     CALCIUM ORAL, Take by mouth., Disp: , Rfl:     DULoxetine (CYMBALTA) 20 MG capsule, Take 20 mg by mouth once daily., Disp: , Rfl:     ergocalciferol, vitamin D2, (VITAMIN D ORAL), Take by mouth., Disp: , Rfl:     letrozole (FEMARA) 2.5 mg Tab, Take 2.5 mg by mouth once daily., Disp: , Rfl:     valACYclovir (VALTREX) 500 MG tablet, TAKE 1 TABLET(500 MG) BY MOUTH EVERY DAY, Disp: 30 tablet, Rfl: 11    DULoxetine (CYMBALTA) 30 MG capsule, TK 1 C PO D, Disp: , Rfl: 3    meloxicam (MOBIC) 7.5 MG tablet, Take 1 tablet (7.5 mg total) by mouth once daily. Take with food, Disp: 30 tablet, Rfl: 0    ondansetron (ZOFRAN-ODT) 4 MG TbDL, Take 1 tablet (4 mg total) by mouth every 6 (six) hours as needed (Nausea). (Patient not taking: Reported on 2/3/2022), Disp: 20 tablet, Rfl: 0    promethazine (PHENERGAN) 25 MG suppository, Place 1 suppository (25 mg total) rectally every 6 (six) hours as needed for Nausea. (Patient not taking: Reported on 2/3/2022), Disp: 5 suppository, Rfl: 0  ALLERGIES: Review of patient's allergies indicates:  No Known Allergies    Cervical spine MRI images and report from 10/12/2017 reviewed:  Findings:  Vertebral body heights and alignment are within normal limits. There is mild disc space loss at the level of C5-C6 with disc osteophyte complex formation. Bone marrow signal is normal.     Visualized posterior fossa structures and cervical cord are unremarkable.     Limited evaluation of the neck soft tissues is unremarkable.     C2-3: No spinal canal stenosis or neuroforaminal narrowing.     C3-4: Uncovertebral arthrosis and mild left facet arthropathy  "resulting in mild left neuroforaminal narrowing and no spinal canal stenosis.     C4-5: Posterior disc osteophyte complex formation and uncovertebral arthrosis without neuroforaminal narrowing or spinal canal stenosis.     C5-6: Posterior disc osteophyte without spinal canal stenosis or neuroforaminal narrowing.     C6-7: No spinal canal stenosis or neuroforaminal narrowing.     C7-T1: No spinal canal stenosis or neuroforaminal narrowing.    IMPRESSION:      Mild cervical spondylosis, as above.    Objective:     VITAL SIGNS: BP (!) 150/92   Ht 5' 5" (1.651 m)   Wt 67.6 kg (149 lb)   BMI 24.79 kg/m²    General    Nursing note and vitals reviewed.  Constitutional: She is oriented to person, place, and time. She appears well-developed and well-nourished.   HENT:   Head: Normocephalic and atraumatic.   no nasal discharge, no external ear redness or discharge   Eyes:   EOM is full and smooth  No eye redness or discharge   Neck: Neck supple. No tracheal deviation present.   Cardiovascular: Normal rate.    2+ Radial pulse bilaterally  2+ Dorsalis Pedis pulse bilaterally  No LE edema appreciated   Pulmonary/Chest: Effort normal. No respiratory distress.   Abdominal: She exhibits no distension.   No rigidity   Neurological: She is alert and oriented to person, place, and time. She exhibits normal muscle tone. Coordination normal.   See details below   Psychiatric: She has a normal mood and affect. Her behavior is normal.                 MUSCULOSKELETAL EXAM:     Cervical Spine: bilateral lcervical region    Observation:    Posture:  Increased thoracic kyphosis  No obvious shoulder un-leveling while standing.    No edema, erythema, or ecchymosis noted in cervical and thoraic spine regions.    No midline skin abnormalities.    No atrophy of upper limb musculature.  Gait: Non-antalgic     Tenderness:  No tenderness throughout the cervical spine upon spinous process palpation  + tenderness over the base of the occiput " bilaterally  No bony deformities or step-offs palpated.   + trigger point tenderness of levator scapulae, upper trapezius, or parascapular musculature      Range of Motion (* = with pain):  CERVICAL SPINE  Decerasd AROM in flexion*, extension, sidebending, and rotation* (increased pain with left rotation)    SHOULDER  Active flexion to 180° on left and 180° on right.  Active abduction to 180° on left and 180° on right.  Active internal rotation to T7 on left and T7 on right.    Active external rotation to T4 on left and T7 on right.    No scapular dyskinesia or winging.    Strength Testing (* = with pain):  Sternocleidomastoid - 5/5 on left and 5/5 on right  Upper trapezius-  5/5 on left and 5/5 on right  Deltoid - 5/5 on left and 5/5 on right  Biceps - 5/5 on left and 5/5 on right  Triceps - 5/5 on left and 5/5 on right  Wrist extension - 5/5 on left and 5/5 on right  Wrist flexion - 5/5 on left and 5/5 on right   - 5/5 on left and 5/5 on right  Finger extension - 5/5 on left and 5/5 on right  Finger abduction - 5/5 on left and 5/5 on right    Structural Exam:  TART (Tissue texture abnormality, Asymmetry,  Restriction of motion and/or Tenderness) changes:    Head: Occipitoatlantal (OA) Joint ES-left, R-right     Cervical Spine   C1 TTA RIGHT   C2 TTA RIGHT   C3 FRS LEFT   C4 FRS LEFT   C5 Neutral   C6 Neutral   C7 FRS RIGHT   bilateral cervical paraspinal Trigger band (TB) fascial distortions     Thoracic Spine   T1 ERS LEFT   T2 FRS RIGHT   T3 FRS LEFT   T4 Neutral   T5 Neutral   T6 FRS RIGHT   T7 FRS RIGHT   T8 FRS LEFT   T9 FRS RIGHT   T10 Neutral   T11 Neutral   T12 Neutral     Rib cage: R1 inhaled on right, R6 external torsion on right, R8 external torsion on left    Upper extremity: right thoracic outlet TTA    Key   F= Flexed   E = Extended   R = Rotated   S = Sidebent   TTA = tissue texture abnormality       Neurovascular Exam:  Spurling's - negative on left and negative on right  Lhermitte's Sign -  absent  Seated straight leg raise - negative on left and negative on right  Reflexes +2/4 and symmetric at the biceps, brachioradialis, and triceps bilaterally   Sensation intact to light touch in the C5-T1 dermatomes bilaterally.   Negative tinel's at the carpal and cubital tunnels bilaterally  Larissa's sign- absent   Intact and symmetric radial pulses bilaterally.      Assessment:      Encounter Diagnoses   Name Primary?    Cervicalgia Yes    Cervical spondylosis     Tension headache     Poor posture     Myalgia     Elevated blood pressure reading in office without diagnosis of hypertension     Somatic dysfunction of head region     Somatic dysfunction of thoracic region     Cervical (neck) region somatic dysfunction     Somatic dysfunction of rib cage region     Upper extremity somatic dysfunction           Plan:   1.  Chronic neck pain with associated underlying mild cervical spondylosis as noted on previous cervical spine MRI from 10/12/17 and biomechanical restrictions upper kinetic chain.  No cervical radicular symptoms appreciated on physical exam today.  Poor posture also likely contributing to biomechanical  and fascial restrictions as well as associated tension headaches.   - OMT performed today to address associated biomechanical restrictions  and HEP started.   - Rx given for Meloxicam 7.5 mg po 1-2 tabs qday x 14 days then prn for pain control. Pt. Advised to avoid all other NSAIDS while on this medication.  -  Recommend patient bringing in recent cervical spine x-ray films and report from [facility] [place] for review  -  Cervical spine MRI from 10/12/2017 noted mild cervical spondylosis, most notable at the C5-6 level.  Images and report personally reviewed.    2.  Elevated BP reading at today's visit.   Recommend follow-up with PCP, Dr. Segal, for further management if high BP reasings persist with log at home over the next 2 weeks. .    3. OMT 5-6 regions. Oral consent obtained.  Reviewed benefits  and potential side effects, including bruising at site of treatment and increased soreness for the next 24-48 hours. Pt. Instructed to increased water intake by 1 L today and tomorrow to help with any residual soreness.   - OMT indicated today due to signs and symptoms as well as local and remote somatic dysfunction findings and their related neurokinetic, lymphatic, fascial and/or arteriovenous body connections.   - OMT techniques used: Myofascial Release, Muscle Energy, Still's Technique and Fascial Distortion Model   - Treatment was tolerated well. Improvement noted in segmental mobility post-treatment in dysfunctional regions. There were no adverse events and no complications immediately following treatment.     4. Pt. Given the following HEP:  A)  Supine Thoracic extension exercise: 10-15 reps, twice daily. Handout also given.   B) Cervicothoracic junction mobilization exercise: 10-15 reps, twice daily. Handout also given.   C) Sidelying reach and roll stretch: Laying on your side with arms extend out, pull back top arm and flex at the elbow, then rotate thoracic spine and open up arm and chest. Repeat for a total of 10 reps on each side, twice daily. Hand-out also given.   D) Scalene, upper trapezius, and levator scapulae stretches : hold neck sidebending stretch for 30 seconds in each plane, bilaterally, twice daily. Hand out given.     [address]44 HOME EXERCISE PROGRAM (HEP):  The patient was taught a homegoing physical therapy regimen as described above. The patient demonstrated understanding of the exercises and proper technique of their execution. This interaction took 15 minutes.     5. Follow-up in 2-3 weeks for reevaluation    6. Patient agreeable to today's plan and all questions were answered    This note is dictated using the M*Modal Fluency Direct word recognition program. There are word recognition mistakes that are occasionally missed on review.

## 2022-02-18 ENCOUNTER — OFFICE VISIT (OUTPATIENT)
Dept: PSYCHIATRY | Facility: CLINIC | Age: 57
End: 2022-02-18
Payer: COMMERCIAL

## 2022-02-18 DIAGNOSIS — F41.9 ANXIETY DISORDER, UNSPECIFIED TYPE: ICD-10-CM

## 2022-02-18 DIAGNOSIS — F33.0 MILD EPISODE OF RECURRENT MAJOR DEPRESSIVE DISORDER: Primary | ICD-10-CM

## 2022-02-18 PROCEDURE — 99999 PR PBB SHADOW E&M-EST. PATIENT-LVL I: CPT | Mod: PBBFAC,,, | Performed by: SOCIAL WORKER

## 2022-02-18 PROCEDURE — 1159F MED LIST DOCD IN RCRD: CPT | Mod: CPTII,S$GLB,, | Performed by: SOCIAL WORKER

## 2022-02-18 PROCEDURE — 99999 PR PBB SHADOW E&M-EST. PATIENT-LVL I: ICD-10-PCS | Mod: PBBFAC,,, | Performed by: SOCIAL WORKER

## 2022-02-18 PROCEDURE — 90785 PR INTERACTIVE COMPLEXITY: ICD-10-PCS | Mod: S$GLB,,, | Performed by: SOCIAL WORKER

## 2022-02-18 PROCEDURE — 1159F PR MEDICATION LIST DOCUMENTED IN MEDICAL RECORD: ICD-10-PCS | Mod: CPTII,S$GLB,, | Performed by: SOCIAL WORKER

## 2022-02-18 PROCEDURE — 90791 PR PSYCHIATRIC DIAGNOSTIC EVALUATION: ICD-10-PCS | Mod: S$GLB,,, | Performed by: SOCIAL WORKER

## 2022-02-18 PROCEDURE — 90785 PSYTX COMPLEX INTERACTIVE: CPT | Mod: S$GLB,,, | Performed by: SOCIAL WORKER

## 2022-02-18 PROCEDURE — 90791 PSYCH DIAGNOSTIC EVALUATION: CPT | Mod: S$GLB,,, | Performed by: SOCIAL WORKER

## 2022-02-18 NOTE — PROGRESS NOTES
"  Psychotherapy Initial Visit (LCSW/PhD)  Diagnostic Interview - CPT 68638    Date: 2/18/2022    Site: [facility]    Referral source: self     Clinical status of patient: Outpatient    Chief complaint/reason for encounter: depression and anxiety    HISTORY OF PRESENTING ILLNESS:  Beverley Porter, a 56 y.o. female, for initial evaluation.  Met with patient.       Content of Current Session:    Pt was on time to scheduled initial session. Session focused on building rapport, establishing a therapeutic relationship and history of mental health treatment. Clinician went over limits to confidentiality, including mandated reporting and safety during potential crisis.  Pt is cooperative and engaged throughout session. No indicators of hallucinations, delusions, bizarre behaviors or other indicators of psychotic process. Pt is forward thinking and goal directed, notes no SI/HI.      Doing good right now.  Sought therapy at a time when she wasn't doing so well.  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  Braulio is Director of PE and Athletics for [employer];  braulio on the rebound from divorce from 1st .  She and Braulio "were best friends".  Braulio quit drinking a while back - before he was 29, now will have a drink on rare occasions.  Tamiko drinks significantly more - knows she drinks too much.  States "My brain is all over the place".  Braulio complains about her drinking.  She's goes to visit a friend - because she knows she can drink there with the friend.  Intimacy is a problem with . She has blackouts and memory loss associated with drinking.  Feels " Braulio might be happier with someone else.    Introduced ACT and values-focused therapeutic approach. Provided Beverley with Values Determination handout to complete and bring to next session.      Patient does not currently have a psychiatrist.  Patient does not currently have a therapist.   Previous Psychiatric Outpatient Treatment:  No  Current medications and drug reactions (include OTC, herbal): see medication list  Pt is taking duloxetine (Cymbalta) 20mg once daily for Depression. They are interested in medication changes.    Current social stressors:   Worries about son Kwadwo all the time - knows she is an enabler.  Recently sold [employer] and moved into father-in-laws home in [place] after inlaws .  Stressful.    Current symptoms:  · Depression: anhedonia and worthlessness/guilt.  · Anxiety: decreased memory, excessive anxiety/worry and irritability  · Insomnia: blackouts associated with drinking; sleep disturbance.  · Leni:  denies.  · Psychosis: denies .  ·     No flowsheet data found.  No flowsheet data found.     Risk assessment:  Patient reports no suicidal ideation  Patient reports no homicidal ideation  Patient reports no self-injurious behavior  Patient reports no violent behavior    PSYCHIATRIC HISTORY:  Previous Psychiatric Hospitalizations:  No  Previous SI/HI:   No  Previous Suicide Attempts:  No  Previous Medication Trials: No Pt has taken citalopram (Celexa) -mg once daily for Depression.  Head trauma:  No  History of Trauma:  No  Legal Issues: No  Access to a Gun:  No    SUBSTANCE ABUSE HISTORY:  Tobacco:  No   Alcohol: problematic drinking  Illicit Substances: No  Misuse of Prescription Medications:  No  Caffeine: WNL    MEDICAL HISTORY: Breast CA 2 yrs ago  Past Medical History:   Diagnosis Date    Genital herpes, unspecified     Hypertension        SOCIAL HISTORY (MARRIAGE, EMPLOYMENT, etc.):  Living Situation: own home, with  and son  Nuclear/Marriage: , 3 adult  "chn  Supports: has friends  Education/Vocation: retired , part-time [employer]   Episcopalian/Spirituality: not discussed  Hobbies and Interests: not discussed    PSYCHIATRIC FAMILY HISTORY: mom and maternal gmo were alcoholics; 3 brothers all "drinki"      Strengths and liabilities: Strength: Patient is intelligent., Strength: Patient is motivated for change., Strength: Patient is physically healthy., Strength: Patient is stable., Liability: Patient has poor judgment, Liability: Patient lacks coping skills.    Current Evaluation:     Mental Status Exam:  General Appearance:  unremarkable, age appropriate   Speech: normal tone, normal rate, normal pitch, normal volume      Level of Cooperation: cooperative      Thought Processes: normal and logical   Mood: euthymic      Thought Content: normal, no suicidality, no homicidality, delusions, or paranoia   Affect: congruent and appropriate   Orientation: Oriented x3   Memory: Sketchy - couldn't recall dtrs age   Attention Span & Concentration: intact   Fund of General Knowledge: intact and appropriate to age and level of education   Abstract Reasoning: Intact   Judgment & Insight: intact     Language  intact     Diagnostic Impression - Plan:       ICD-10-CM ICD-9-CM   1. Mild episode of recurrent major depressive disorder  F33.0 296.31   2. Anxiety disorder, unspecified type  F41.9 300.00           TREATMENT GOALS: Anxiety: reducing negative automatic thoughts and reducing time spent worrying (<30 minutes/day)  Drug & Alcohol: reduce dependence on etoh for socialization by increasing values-focused approach to relationships    PLAN: In this session a psych evaluation was conducted to get history and process pt's life. Mindfulness Techniques and ACT will be utilized in future individual  therapy sessions to increase insight, support and behavior modification.       Length of Service (minutes): 60    Return to Clinic: 2 weeks    "

## 2022-03-11 ENCOUNTER — PATIENT MESSAGE (OUTPATIENT)
Dept: PSYCHIATRY | Facility: CLINIC | Age: 57
End: 2022-03-11
Payer: COMMERCIAL

## 2022-03-11 ENCOUNTER — OFFICE VISIT (OUTPATIENT)
Dept: PSYCHIATRY | Facility: CLINIC | Age: 57
End: 2022-03-11
Payer: COMMERCIAL

## 2022-03-11 DIAGNOSIS — F41.9 ANXIETY DISORDER, UNSPECIFIED TYPE: ICD-10-CM

## 2022-03-11 DIAGNOSIS — F33.0 MILD EPISODE OF RECURRENT MAJOR DEPRESSIVE DISORDER: Primary | ICD-10-CM

## 2022-03-11 PROCEDURE — 90834 PR PSYCHOTHERAPY W/PATIENT, 45 MIN: ICD-10-PCS | Mod: S$GLB,,, | Performed by: SOCIAL WORKER

## 2022-03-11 PROCEDURE — 1159F PR MEDICATION LIST DOCUMENTED IN MEDICAL RECORD: ICD-10-PCS | Mod: CPTII,S$GLB,, | Performed by: SOCIAL WORKER

## 2022-03-11 PROCEDURE — 90834 PSYTX W PT 45 MINUTES: CPT | Mod: S$GLB,,, | Performed by: SOCIAL WORKER

## 2022-03-11 PROCEDURE — 99999 PR PBB SHADOW E&M-EST. PATIENT-LVL I: ICD-10-PCS | Mod: PBBFAC,,, | Performed by: SOCIAL WORKER

## 2022-03-11 PROCEDURE — 1159F MED LIST DOCD IN RCRD: CPT | Mod: CPTII,S$GLB,, | Performed by: SOCIAL WORKER

## 2022-03-11 PROCEDURE — 99999 PR PBB SHADOW E&M-EST. PATIENT-LVL I: CPT | Mod: PBBFAC,,, | Performed by: SOCIAL WORKER

## 2022-03-11 NOTE — PROGRESS NOTES
Cecilia Arciniega, [facility]  Individual Psychotherapy   Beverley YEISON Porter,  3/11/2022    Site: [facility] - Dept of Psychiatry    Therapeutic Intervention: Met with patient for individual psychotherapy follow up.    Chief complaint/reason for encounter: depression, anxiety and interpersonal     Content of current session: Met with Beverley for follow up session.  Pt was tearful off and on through session.  She shared that  she is frustrated with  and his unwillingness to part with much of the stuff that was left in the home by his mom and vanessa.  She states that they had 2 storage units that are causing a lot of money and she is concerned that he will never agree to get rid of the day.  It is the situation is causing discord between them.  And shares that she did not do her homework, the values determination exercise, states that she looked at it and just did not make the time to do it.  Goes on to say that that is ridiculous because she has plenty of times.  Patient became tearful when she began to talk about the relationship with  and lack of affection that she receives from him and he is always saying that she does not love him.  She states that she does love him but she wants more intimacy that does not always lead to sexual intercourse.  I introduced to patient the concepts of the 5 love languages and began to talk about what leads her to feel loved and cared for.  We began to discuss ways that she might express love to  that she has not been in recent years.  In 1 on to share that when her oldest son was born her  refused to allow him to sleep in the bed with him and she began sleeping in her son's room early in his life and continue to do so for many years.  Her  was adamant about the children not sleeping in the bed with them, but then when they got a dog he allowed the dog to sleep in the bed and and wonders what that is all about.  And also shared that the oldest son Kwadwo has  moved back into their home and she feels that he treats her badly and so unappreciative like he has showed everything.  We started to explore patient's boundaries with her son and she realizes that she has difficulty setting rules and boundaries with him.  Therapist will send some boundary information through the [facility] for patient to begin to familiarize herself on different types of boundaries.  Follow-up appointment scheduled in 2 weeks and we also went ahead and scheduled a follow-up to that 2 weeks later.    Between now and next session and will begin to try to exercise some new ways of touching her  and see how he responds to that.    Treatment plan:  · Target symptoms: depression  · Why chosen therapy is appropriate versus another modality: patient responds to this modality  · Outcome monitoring methods: self-report, observation  · Therapeutic intervention type: insight oriented psychotherapy, supportive psychotherapy    Risk parameters:  Patient reports no suicidal ideation  Patient reports no homicidal ideation  Patient reports no self-injurious behavior  Patient reports no violent behavior      Patient's response to intervention:  The patient's response to intervention is accepting.    Progress toward goals and other mental status changes:  The patient's progress toward goals is limited.    Diagnosis:     ICD-10-CM ICD-9-CM   1. Mild episode of recurrent major depressive disorder  F33.0 296.31   2. Anxiety disorder, unspecified type  F41.9 300.00       Plan: Pt plans to continue individual psychotherapy    Return to clinic: 2 weeks    Length of Service (minutes): 45

## 2022-03-17 ENCOUNTER — OFFICE VISIT (OUTPATIENT)
Dept: SPORTS MEDICINE | Facility: CLINIC | Age: 57
End: 2022-03-17
Payer: COMMERCIAL

## 2022-03-17 VITALS
HEIGHT: 65 IN | DIASTOLIC BLOOD PRESSURE: 70 MMHG | SYSTOLIC BLOOD PRESSURE: 120 MMHG | BODY MASS INDEX: 24.83 KG/M2 | WEIGHT: 149 LBS

## 2022-03-17 DIAGNOSIS — M99.02 SOMATIC DYSFUNCTION OF THORACIC REGION: ICD-10-CM

## 2022-03-17 DIAGNOSIS — G44.229 CHRONIC TENSION-TYPE HEADACHE, NOT INTRACTABLE: ICD-10-CM

## 2022-03-17 DIAGNOSIS — M99.00 SOMATIC DYSFUNCTION OF HEAD REGION: ICD-10-CM

## 2022-03-17 DIAGNOSIS — M54.2 CERVICALGIA: Primary | ICD-10-CM

## 2022-03-17 DIAGNOSIS — M99.08 SOMATIC DYSFUNCTION OF RIB CAGE REGION: ICD-10-CM

## 2022-03-17 DIAGNOSIS — M99.01 CERVICAL (NECK) REGION SOMATIC DYSFUNCTION: ICD-10-CM

## 2022-03-17 DIAGNOSIS — M99.07 UPPER EXTREMITY SOMATIC DYSFUNCTION: ICD-10-CM

## 2022-03-17 DIAGNOSIS — M79.10 MYALGIA: ICD-10-CM

## 2022-03-17 DIAGNOSIS — M47.812 CERVICAL SPONDYLOSIS: ICD-10-CM

## 2022-03-17 PROCEDURE — 99999 PR PBB SHADOW E&M-EST. PATIENT-LVL III: ICD-10-PCS | Mod: PBBFAC,,, | Performed by: NEUROMUSCULOSKELETAL MEDICINE & OMM

## 2022-03-17 PROCEDURE — 98927 PR OSTEOPATHIC MANIP,5-6 BODY REGN: ICD-10-PCS | Mod: S$GLB,,, | Performed by: NEUROMUSCULOSKELETAL MEDICINE & OMM

## 2022-03-17 PROCEDURE — 99214 OFFICE O/P EST MOD 30 MIN: CPT | Mod: 25,S$GLB,, | Performed by: NEUROMUSCULOSKELETAL MEDICINE & OMM

## 2022-03-17 PROCEDURE — 3074F SYST BP LT 130 MM HG: CPT | Mod: CPTII,S$GLB,, | Performed by: NEUROMUSCULOSKELETAL MEDICINE & OMM

## 2022-03-17 PROCEDURE — 3078F PR MOST RECENT DIASTOLIC BLOOD PRESSURE < 80 MM HG: ICD-10-PCS | Mod: CPTII,S$GLB,, | Performed by: NEUROMUSCULOSKELETAL MEDICINE & OMM

## 2022-03-17 PROCEDURE — 1159F PR MEDICATION LIST DOCUMENTED IN MEDICAL RECORD: ICD-10-PCS | Mod: CPTII,S$GLB,, | Performed by: NEUROMUSCULOSKELETAL MEDICINE & OMM

## 2022-03-17 PROCEDURE — 3008F BODY MASS INDEX DOCD: CPT | Mod: CPTII,S$GLB,, | Performed by: NEUROMUSCULOSKELETAL MEDICINE & OMM

## 2022-03-17 PROCEDURE — 99214 PR OFFICE/OUTPT VISIT, EST, LEVL IV, 30-39 MIN: ICD-10-PCS | Mod: 25,S$GLB,, | Performed by: NEUROMUSCULOSKELETAL MEDICINE & OMM

## 2022-03-17 PROCEDURE — 3074F PR MOST RECENT SYSTOLIC BLOOD PRESSURE < 130 MM HG: ICD-10-PCS | Mod: CPTII,S$GLB,, | Performed by: NEUROMUSCULOSKELETAL MEDICINE & OMM

## 2022-03-17 PROCEDURE — 1159F MED LIST DOCD IN RCRD: CPT | Mod: CPTII,S$GLB,, | Performed by: NEUROMUSCULOSKELETAL MEDICINE & OMM

## 2022-03-17 PROCEDURE — 3078F DIAST BP <80 MM HG: CPT | Mod: CPTII,S$GLB,, | Performed by: NEUROMUSCULOSKELETAL MEDICINE & OMM

## 2022-03-17 PROCEDURE — 99999 PR PBB SHADOW E&M-EST. PATIENT-LVL III: CPT | Mod: PBBFAC,,, | Performed by: NEUROMUSCULOSKELETAL MEDICINE & OMM

## 2022-03-17 PROCEDURE — 3008F PR BODY MASS INDEX (BMI) DOCUMENTED: ICD-10-PCS | Mod: CPTII,S$GLB,, | Performed by: NEUROMUSCULOSKELETAL MEDICINE & OMM

## 2022-03-17 PROCEDURE — 1160F RVW MEDS BY RX/DR IN RCRD: CPT | Mod: CPTII,S$GLB,, | Performed by: NEUROMUSCULOSKELETAL MEDICINE & OMM

## 2022-03-17 PROCEDURE — 1160F PR REVIEW ALL MEDS BY PRESCRIBER/CLIN PHARMACIST DOCUMENTED: ICD-10-PCS | Mod: CPTII,S$GLB,, | Performed by: NEUROMUSCULOSKELETAL MEDICINE & OMM

## 2022-03-17 PROCEDURE — 98927 OSTEOPATH MANJ 5-6 REGIONS: CPT | Mod: S$GLB,,, | Performed by: NEUROMUSCULOSKELETAL MEDICINE & OMM

## 2022-03-17 RX ORDER — MELOXICAM 7.5 MG/1
7.5 TABLET ORAL DAILY
Qty: 30 TABLET | Refills: 2 | Status: SHIPPED | OUTPATIENT
Start: 2022-03-17 | End: 2022-12-06

## 2022-03-17 NOTE — PROGRESS NOTES
Subjective:     Beverley Porter    Chief Complaint   Patient presents with    Follow-up       HPI      Beverley is a 56 y.o. female coming in today for neck pain. Since last visit the pain has Improved for a few weeks, then deteriorated. Today she is having pain in bilateral neck, right upper trap and upper arm. The pain is better with rest and worse with sitting, turning head. Pt. describes the pain as a 7/10 achy pain that does not radiate. There has not been any new a fall/injury/ or traumas since last visit.  Pt. denies any new musculoskeletal complaints at this time.     Office note from 2/3/22 reviewed    Review of Systems   Constitutional: Negative for chills and fever.   Musculoskeletal: Positive for myalgias and neck pain. Negative for back pain, falls and joint pain.   Neurological: Negative for dizziness, tingling, focal weakness, weakness and headaches.         PAST MEDICAL HISTORY:   Past Medical History:   Diagnosis Date    Genital herpes, unspecified     Hypertension      PAST SURGICAL HISTORY:   Past Surgical History:   Procedure Laterality Date    DILATION AND CURETTAGE OF UTERUS      ENDOMETRIAL ABLATION      FOOT SURGERY       FAMILY HISTORY:   Family History   Problem Relation Age of Onset    Breast cancer Paternal Grandmother     Colon cancer Neg Hx     Ovarian cancer Neg Hx      SOCIAL HISTORY:   Social History     Socioeconomic History    Marital status:    Tobacco Use    Smoking status: Never Smoker    Smokeless tobacco: Never Used   Substance and Sexual Activity    Alcohol use: No       MEDICATIONS:   Current Outpatient Medications:     CALCIUM ORAL, Take by mouth., Disp: , Rfl:     DULoxetine (CYMBALTA) 20 MG capsule, Take 20 mg by mouth once daily., Disp: , Rfl:     DULoxetine (CYMBALTA) 30 MG capsule, TK 1 C PO D, Disp: , Rfl: 3    ergocalciferol, vitamin D2, (VITAMIN D ORAL), Take by mouth., Disp: , Rfl:     letrozole (FEMARA) 2.5 mg Tab, Take 2.5 mg by mouth once  "daily., Disp: , Rfl:     meloxicam (MOBIC) 7.5 MG tablet, Take 1 tablet (7.5 mg total) by mouth once daily. Take with food, Disp: 30 tablet, Rfl: 2    ondansetron (ZOFRAN-ODT) 4 MG TbDL, Take 1 tablet (4 mg total) by mouth every 6 (six) hours as needed (Nausea). (Patient not taking: Reported on 2/3/2022), Disp: 20 tablet, Rfl: 0    promethazine (PHENERGAN) 25 MG suppository, Place 1 suppository (25 mg total) rectally every 6 (six) hours as needed for Nausea. (Patient not taking: Reported on 2/3/2022), Disp: 5 suppository, Rfl: 0    valACYclovir (VALTREX) 500 MG tablet, TAKE 1 TABLET(500 MG) BY MOUTH EVERY DAY, Disp: 30 tablet, Rfl: 11  ALLERGIES: Review of patient's allergies indicates:  No Known Allergies    Cervical spine MRI images and report from 10/12/2017 reviewed:  Findings:  Vertebral body heights and alignment are within normal limits. There is mild disc space loss at the level of C5-C6 with disc osteophyte complex formation. Bone marrow signal is normal.     Visualized posterior fossa structures and cervical cord are unremarkable.     Limited evaluation of the neck soft tissues is unremarkable.     C2-3: No spinal canal stenosis or neuroforaminal narrowing.     C3-4: Uncovertebral arthrosis and mild left facet arthropathy resulting in mild left neuroforaminal narrowing and no spinal canal stenosis.     C4-5: Posterior disc osteophyte complex formation and uncovertebral arthrosis without neuroforaminal narrowing or spinal canal stenosis.     C5-6: Posterior disc osteophyte without spinal canal stenosis or neuroforaminal narrowing.     C6-7: No spinal canal stenosis or neuroforaminal narrowing.     C7-T1: No spinal canal stenosis or neuroforaminal narrowing.    IMPRESSION:      Mild cervical spondylosis, as above.    Objective:     VITAL SIGNS: /70   Ht 5' 5" (1.651 m)   Wt 67.6 kg (149 lb)   BMI 24.79 kg/m²    General    Vitals reviewed.  Constitutional: She is oriented to person, place, and " time. She appears well-developed and well-nourished.   Neurological: She is alert and oriented to person, place, and time.   Psychiatric: She has a normal mood and affect. Her behavior is normal.                 MUSCULOSKELETAL EXAM:     Cervical Spine: bilateral lcervical region    Observation:    Posture:  Increased thoracic kyphosis  No obvious shoulder un-leveling while standing.    No edema, erythema, or ecchymosis noted in cervical and thoraic spine regions.    No midline skin abnormalities.    No atrophy of upper limb musculature.  Gait: Non-antalgic     Tenderness:  No tenderness throughout the cervical spine upon spinous process palpation  + tenderness over the base of the occiput on right  No bony deformities or step-offs palpated.   + trigger point tenderness of right upper trapezius musculature      Range of Motion (* = with pain):  CERVICAL SPINE  Decerasd AROM in flexion*, extension, sidebending*, and rotation* ((improved following OMT)    SHOULDER  Active flexion to 180° on left and 180° on right.  Active abduction to 180° on left and 180° on right.  Active internal rotation to T7 on left and T7 on right.    Active external rotation to T4 on left and T7 on right.    No scapular dyskinesia or winging.    Strength Testing (* = with pain):  Sternocleidomastoid - 5/5 on left and 5/5 on right  Upper trapezius-  5/5 on left and 5/5 on right  Deltoid - 5/5 on left and 5/5 on right  Biceps - 5/5 on left and 5/5 on right  Triceps - 5/5 on left and 5/5 on right  Wrist extension - 5/5 on left and 5/5 on right  Wrist flexion - 5/5 on left and 5/5 on right   - 5/5 on left and 5/5 on right  Finger extension - 5/5 on left and 5/5 on right  Finger abduction - 5/5 on left and 5/5 on right    Structural Exam:  TART (Tissue texture abnormality, Asymmetry,  Restriction of motion and/or Tenderness) changes:    Head: Occipitoatlantal (OA) Joint ES-left, R-right     Cervical Spine   C1 TTA RIGHT   C2 FRS LEFT   C3 FRS  LEFT   C4 FRS LEFT   C5 Neutral   C6 FRS LEFT   C7 TTA RIGHT and right, bilateral paraspinal Herniated trigger point (HTP) fascial distortions      Thoracic Spine   T1 ERS LEFT   T2 Neutral   T3 Neutral   T4 Neutral   T5 Neutral   T6 Neutral   T7 Neutral   T8 Neutral   T9 Neutral   T10 Neutral   T11 Neutral   T12 Neutral     Rib cage: R1 inhaled on right    Upper extremity: right thoracic outlet TTA    Key   F= Flexed   E = Extended   R = Rotated   S = Sidebent   TTA = tissue texture abnormality       Neurovascular Exam:  Spurling's - negative on left and negative on right  Lhermitte's Sign - absent   Seated straight leg raise - negative on left and negative on right  Reflexes +2/4 and symmetric at the biceps, brachioradialis, and triceps bilaterally   Sensation intact to light touch in the C5-T1 dermatomes bilaterally.   Negative tinel's at the carpal and cubital tunnels bilaterally  Larissa's sign- absent   Intact and symmetric radial pulses bilaterally.    IMAGIN. X-ray ordered due to bilateral neck pain. (AP and lateral views) taken 9/15/21 at [facility].  Comparison films from 2013 noted on radiology report.  2. X-ray images were reviewed personally by me and then directly with patient.  3. FINDINGS: X-ray images obtained demonstrate trace retrolisthesis at C5 vertebral body.  Increased moderate degenerative disc disease at C5-6 with circumferential osteophyte formation and endplate irregularity.  Mild facet arthropathy at this level as well.  Anterior osteophyte formation and ligamentous ossification at C4-5.  No prominent soft tissue findings detected.  4. IMPRESSION:  Multilevel Cervical spondylosis, most prominent at the C5-6 level.      Assessment:      Encounter Diagnoses   Name Primary?    Cervicalgia Yes    Cervical spondylosis     Chronic tension-type headache, not intractable     Myalgia     Somatic dysfunction of head region     Cervical (neck) region somatic dysfunction     Somatic  dysfunction of thoracic region     Somatic dysfunction of rib cage region     Upper extremity somatic dysfunction           Plan:   1.  Chronic neck pain with associated underlying mild cervical spondylosis as noted on previous cervical spine MRI from 10/12/17 and recent outside cervical spine x-rays. Biomechanical restrictions upper kinetic chain also noted.  No cervical radicular symptoms appreciated on physical exam today.  Poor posture also likely contributing to biomechanical  and fascial restrictions as well as associated tension headaches.   - Referral to outpatient PT ([facility] location)  - OMT performed again today to address associated biomechanical restrictions  and HEP reviewed  - Continue Meloxicam 7.5 mg po 1 tabs qday prn for pain control, refilled today. Pt. Advised to avoid all other NSAIDS while on this medication.  -  Cervical spine MRI from 10/12/2017 noted mild cervical spondylosis, most notable at the C5-6 level.  Images and report personally reviewed.  - Outside cervical spine x-rays from [facility] taken on 09/15/2021 (AP and lateral views) showed multilevel Cervical spondylosis, most prominent at the C5-6 level.  Images personally reviewed with patient.    2. OMT 5-6 regions. Oral consent obtained.  Reviewed benefits and potential side effects, including bruising at site of treatment and increased soreness for the next 24-48 hours. Pt. Instructed to increased water intake by 1 L today and tomorrow to help with any residual soreness.   - OMT indicated today due to signs and symptoms as well as local and remote somatic dysfunction findings and their related neurokinetic, lymphatic, fascial and/or arteriovenous body connections.   - OMT techniques used: Myofascial Release, Muscle Energy, Still's Technique and Fascial Distortion Model   - Treatment was tolerated well. Improvement noted in segmental mobility post-treatment in dysfunctional regions. There were no adverse events  and no complications immediately following treatment.     3. Reviewed with patient the following HEP:  Continue:  A)  Supine Thoracic extension exercise: 10-15 reps, twice daily. Handout also given.   B) Cervicothoracic junction mobilization exercise: 10-15 reps, twice daily. Handout also given.   C) Sidelying reach and roll stretch: Laying on your side with arms extend out, pull back top arm and flex at the elbow, then rotate thoracic spine and open up arm and chest. Repeat for a total of 10 reps on each side, twice daily. Hand-out also given.   D) Scalene, upper trapezius, and levator scapulae stretches : hold neck sidebending stretch for 30 seconds in each plane, bilaterally, twice daily. Hand out given.      The patient was taught a homegoing physical therapy regimen as described above. The patient demonstrated understanding of the exercises and proper technique of their execution.     4. Follow-up upon completion of PT if pain persist or deteriorates    5. Patient agreeable to today's plan and all questions were answered    This note is dictated using the M*Modal Fluency Direct word recognition program. There are word recognition mistakes that are occasionally missed on review.

## 2022-03-28 ENCOUNTER — OFFICE VISIT (OUTPATIENT)
Dept: PSYCHIATRY | Facility: CLINIC | Age: 57
End: 2022-03-28
Payer: COMMERCIAL

## 2022-03-28 DIAGNOSIS — F33.0 MILD EPISODE OF RECURRENT MAJOR DEPRESSIVE DISORDER: Primary | ICD-10-CM

## 2022-03-28 DIAGNOSIS — F41.9 ANXIETY DISORDER, UNSPECIFIED TYPE: ICD-10-CM

## 2022-03-28 PROCEDURE — 99999 PR PBB SHADOW E&M-EST. PATIENT-LVL I: ICD-10-PCS | Mod: PBBFAC,,, | Performed by: SOCIAL WORKER

## 2022-03-28 PROCEDURE — 90834 PR PSYCHOTHERAPY W/PATIENT, 45 MIN: ICD-10-PCS | Mod: S$GLB,,, | Performed by: SOCIAL WORKER

## 2022-03-28 PROCEDURE — 99999 PR PBB SHADOW E&M-EST. PATIENT-LVL I: CPT | Mod: PBBFAC,,, | Performed by: SOCIAL WORKER

## 2022-03-28 PROCEDURE — 90834 PSYTX W PT 45 MINUTES: CPT | Mod: S$GLB,,, | Performed by: SOCIAL WORKER

## 2022-03-28 NOTE — PROGRESS NOTES
[facility], [facility]  Individual Psychotherapy   Beverley YEISON Porter,  3/28/2022    Site: [facility] - Dept of Psychiatry    Therapeutic Intervention: Met with patient for individual psychotherapy follow up.    Chief complaint/reason for encounter: depression, anxiety and interpersonal     Content of current session: Met with Beverley for follow up session.    Returned to [facility] [place] a couple of Sundays - sounds apathetic about it,  having been raised Sikh.  Has been pondering resuming her devotional practices in the morning and will give some intentional thought to where she would do this in the house (no office space). Discussed communication patterns between Tamiko and Braulio - he often umpires on Sunday and that interferes with some family gatherings - a little annoying for Beverley.  Sometimes likes when he umpires in the evenings because it allows her to go to her friends house and drink.  Did not read boundary info.  Knows she and Braulio need to form a plan for conveying expectations to Kwadwo regarding rent.    Joined [employer] and a group fitness class.      FU in 2 weeks.             Pt was tearful off and on through session.  She shared that  she is frustrated with  and his unwillingness to part with much of the stuff that was left in the home by his mom and j carlosd.  She states that they had 2 storage units that are causing a lot of money and she is concerned that he will never agree to get rid of the day.  It is the situation is causing discord between them.  And shares that she did not do her homework, the values determination exercise, states that she looked at it and just did not make the time to do it.  Goes on to say that that is ridiculous because she has plenty of times.  Patient became tearful when she began to talk about the relationship with  and lack of affection that she receives from him and he is always saying that she does not love him.  She states that she does love him  but she wants more intimacy that does not always lead to sexual intercourse.  I introduced to patient the concepts of the 5 love languages and began to talk about what leads her to feel loved and cared for.  We began to discuss ways that she might express love to  that she has not been in recent years.  In 1 on to share that when her oldest son was born her  refused to allow him to sleep in the bed with him and she began sleeping in her son's room early in his life and continue to do so for many years.  Her  was adamant about the children not sleeping in the bed with them, but then when they got a dog he allowed the dog to sleep in the bed and and wonders what that is all about.  And also shared that the oldest son Kwadwo has moved back into their home and she feels that he treats her badly and so unappreciative like he has showed everything.  We started to explore patient's boundaries with her son and she realizes that she has difficulty setting rules and boundaries with him.  Therapist will send some boundary information through the [employer] for patient to begin to familiarize herself on different types of boundaries.  Follow-up appointment scheduled in 2 weeks and we also went ahead and scheduled a follow-up to that 2 weeks later.    Between now and next session and will begin to try to exercise some new ways of touching her  and see how he responds to that.    Treatment plan:  · Target symptoms: depression  · Why chosen therapy is appropriate versus another modality: patient responds to this modality  · Outcome monitoring methods: self-report, observation  · Therapeutic intervention type: insight oriented psychotherapy, supportive psychotherapy    Risk parameters:  Patient reports no suicidal ideation  Patient reports no homicidal ideation  Patient reports no self-injurious behavior  Patient reports no violent behavior      Patient's response to intervention:  The patient's response  to intervention is accepting.    Progress toward goals and other mental status changes:  The patient's progress toward goals is limited.    Diagnosis:   No diagnosis found.    Plan: Pt plans to continue individual psychotherapy    Return to clinic: 2 weeks    Length of Service (minutes): 45

## 2022-04-05 ENCOUNTER — PATIENT MESSAGE (OUTPATIENT)
Dept: SPORTS MEDICINE | Facility: CLINIC | Age: 57
End: 2022-04-05
Payer: COMMERCIAL

## 2022-04-05 ENCOUNTER — CLINICAL SUPPORT (OUTPATIENT)
Dept: REHABILITATION | Facility: HOSPITAL | Age: 57
End: 2022-04-05
Payer: COMMERCIAL

## 2022-04-05 DIAGNOSIS — M54.2 CERVICALGIA: ICD-10-CM

## 2022-04-05 DIAGNOSIS — M47.812 CERVICAL SPONDYLOSIS: ICD-10-CM

## 2022-04-05 DIAGNOSIS — R29.898 DECREASED RANGE OF MOTION OF NECK: ICD-10-CM

## 2022-04-05 DIAGNOSIS — M53.82 NECK MUSCLE WEAKNESS: ICD-10-CM

## 2022-04-05 PROCEDURE — 97110 THERAPEUTIC EXERCISES: CPT | Mod: PN | Performed by: PHYSICAL MEDICINE & REHABILITATION

## 2022-04-05 PROCEDURE — 97162 PT EVAL MOD COMPLEX 30 MIN: CPT | Mod: PN | Performed by: PHYSICAL MEDICINE & REHABILITATION

## 2022-04-05 NOTE — PLAN OF CARE
[facility] - PHYSICAL THERAPY EVALUATION     Name: Beverley Porter  Clinic Number: [id number]    Therapy Diagnosis:   Encounter Diagnoses   Name Primary?    Cervicalgia     Cervical spondylosis     Decreased range of motion of neck     Neck muscle weakness      Physician: Raisa Rubin DO    Physician Orders: PT Eval and Treat    Medical Diagnosis from Referral:   M54.2 (ICD-10-CM) - Cervicalgia   M47.812 (ICD-10-CM) - Cervical spondylosis       Evaluation Date: 4/5/2022  Authorization Period Expiration: 3/17/23  Plan of Care Expiration: 7/5/22  Reassessment Due: 5/5/22  Visit # / Visits authorized: 1/ 1    Time In: 9:00 am  Time Out: 10:20 am  Total Billable Time: 80 minutes    Precautions:  Breast CA- bilat mastectomy with chemo, and abdominal and thigh tissue, not back      Pattern of pain determined: 1       Subjective   Date of onset: about 6-8 months ago  History of current condition - Beverley reports: She started with neck pain for years, intermittently.  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    No specific incident caused pain, but these  may have been factors that contributed.  Pain has been on and off for years, and she has gone periods without symptoms for quite awhile.  She was diagnosed with breast cancer 2 years ago and she had mastectomy and reconstruction surgery and subsequent reconstruction.  She had chemo after neck.   Neck pain just slowly came on around 6 months ago, for no reason.  She started with pain at the base of her neck and it comes down into her neck and mid back and it goes up into the base and back of her head.  She feels she has to be careful how she moves.  She feels she really has to watch how she turns her head.  Some days are better than others.  8/10 at worst, 0/10 at rest.  5/10 on average.  It is daily.  She has trouble going to sleep.  It wakes her up.  She did have a fall 1 month ago and she fell on the stairs and  her right shoulder has been sore since then.  No arm symptoms      Neck pain is intermittent but daily.  Worse with sleeping, turning, waking up in the morning, sometimes just sharp pain that hits her, h driving, if she is more physical and moving stuff    Better with just resting and being still       Medical History:   Past Medical History:   Diagnosis Date    Genital herpes, unspecified     Hypertension        Surgical History:   Beverley Porter  has a past surgical history that includes Dilation and curettage of uterus; Endometrial ablation; and Foot surgery.    Medications:   Beverley has a current medication list which includes the following prescription(s): calcium, duloxetine, duloxetine, ergocalciferol (vitamin d2), letrozole, meloxicam, and valacyclovir.    Allergies:   Review of patient's allergies indicates:  No Known Allergies     Imaging, MRI studies: 2017  Limited evaluation of the neck soft tissues is unremarkable.     C2-3: No spinal canal stenosis or neuroforaminal narrowing.     C3-4: Uncovertebral arthrosis and mild left facet arthropathy resulting in mild left neuroforaminal narrowing and no spinal canal stenosis.     C4-5: Posterior disc osteophyte complex formation and uncovertebral arthrosis without neuroforaminal narrowing or spinal canal stenosis.     C5-6: Posterior disc osteophyte without spinal canal stenosis or neuroforaminal narrowing.     C6-7: No spinal canal stenosis or neuroforaminal narrowing.     C7-T1: No spinal canal stenosis or neuroforaminal narrowing.  IMPRESSION:      Mild cervical spondylosis, as above.    Prior Therapy: little therapy- not sure it helped (exercises, massage)  Prior Treatment: nil  Social History: lives with   Occupation: retired from school system  Leisure: she has tried to exercise, she joined [employer]      Prior Level of Function: full  Current Level of Function: manages with pain  DME owned/used: member [employer]- does  treadmill        Pain:  Current 0/10, worst 8/10, best 0/10   Location: bilateral neck   Description: Aching, Grabbing and Sharp  Aggravating Factors: Sitting, Laying, Bending, Touching, Morning and Extension  Easing Factors: rest  Disturbed Sleep: yes        Pattern of pain questions:  1.  Where is your pain the worst? neck  2.  Is your pain constant or intermittent? intermittent  3.  Does bending forward make your typical pain worse? yes  4.  Since the start of your neck pain, has there been a change in your bowel or bladder? no  5.  What can't you do now that you use to be able to do?  sleep      Pts goals: improve sleep, only be woken once by neck pain (currently wakes her 3-4/night at eval), reduce over all pain, be more physical      Red Flag Screening:   Cough  Sneeze  Strain: (--)  Bladder/ bowel: (--)  Falls: (+) 1 month ago  Night pain: (+) she wakes but can re position and go back to sleep  Unexplained weight loss: (--)  General health: good, cancer survivor     OBJECTIVE   Sitting: fair posture  Standing: fair posture  Correction of posture: better with lumbar roll    Range of Motion - MOVEMENT LOSS    ROM Loss   Flexion within functional limits chin 2 fingers from chest, increases symptoms   Extension moderate loss   Side bending Right major loss  12   Side bending Left major loss 15   Rotation Right major loss  45   Rotation Left major loss  47   Protraction minimal loss   Retraction  major loss       Upper Extremity Strength  (R) UE  (L) UE    Shoulder flexion: 5/5 Shoulder flexion: 5/5   Shoulder Abduction: 5/5 Shoulder abduction: 5/5   Elbow flexion: 5/5 Elbow flexion: 5/5   Elbow extension: 5/5 Elbow extension: 5/5   Wrist flexion: 5/5 Wrist flexion: 5/5   Wrist extension: 5/5 Wrist extension: 5/5    5/5 : 5/5     Walks on toes, walks on heels, stands 10 sec single leg stance, able to squat      NEUROLOGICAL SCREEN    Sensory deficit: intact    Special Tests:   Test Name  Testing Result    Compression (+)   Distraction (--)   Neural Tension Test (--)   Saddle Sensation (--) negative     Reflexes:    Left Right   Biceps  2+ 2+   Brachioradialis  2+ 2+   Clonus (--) (--)   Babinski (--) (--)       REPEATED TEST MOVEMENTS: neck pain  Repeated Protraction in Sitting pain during motion  worse   Repeated Flexion in Sitting pain during motion  worse   Repeated Retraction in Sitting  end range pain  no worse   Repeated Retraction Extension in Sitting end range pain  no worse     Repeated retractions with slowly improving rotation and side bends, and slight reduction in pain    Baseline Isometric Testing on Med X equipment:  Testing administered by PT  Date of testin22  ROM 36-90  deg   Max Peak Torque 109    Min Peak Torque 62    Flex/Ext Ratio 1.4/1   % below normative data -54%       [facility] 2022   Visit Number 1   VAS Pain Rating 8   Cervical Stretches - Retraction In Lying 10   Retraction in Sitting 10   Scapular Retraction 10   Cervical Extension Seat Pad 0   Seat Adjustment 335   Top Dead Center 66   Counterweight 1   Cervical Flexion 90   Cervical Extension 36   Cervical Peak Torque 109   Ice - Z Lie (in min.) 10         GAIT:  Assistive Device used: none  Level of Assistance: independent  Patient displays the following gait deviations:  no gait deviations observed.         Limitation/Restriction for FOTO 22 Survey    Therapist reviewed FOTO scores for Beverley LATHAM [place] on 2022.   FOTO documents entered into [employer] - see Media section.    Limitation Score: 47 % limited  Goal < 30% limited             Treatment   Treatment Time In: 9:50 am  Treatment Time Out: 10:20 am  Total Treatment time separate from Evaluation: 30 minutes      Beverley received therapeutic exercises to develop/improve posture, lumbar/cervical ROM, strength and muscular endurance for 30 minutes including the following exercises:   Med x dynamic exercise and baseline IM testing    Beverley received the following manual  therapy techniques: nil were applied to the:          Written Home Exercises Provided: yes.  HEP:  Stretching:  Neck retractions seated and supine with towel 3/day 10 reps  scap retraction 3/day  Progress to retraction ext as tolerated, thoracic extension, incorporate lateral movements and rotation  Progress to thoracic mobility and scap strengthening  Also consider diaphragmatic breathing   Exercises were reviewed and Beverley was able to demonstrate them prior to the end of the session.  Beverley demonstrated good  understanding of the education provided.     See EMR under Patient Instructions for exercises provided 4/5/2022.      Education provided:   - Patient received education regarding proper posture and body mechanics.  Patient was given top [facility] Visit 1 handouts which discuss what to expect in therapy, the purpose and opportunity for health coaching, the program,  wellness when discharged from therapy, back education and care specifically for posture seated, standing, lifting correctly, components of exercise, importance of nutrition and hydration, and importance of sleep.   Information on lumbar rolls provided.  Patient received education regarding proper posture and body mechanics.  - Hermes roll tried, recommended, and purchase information was provided.    - Patient received a handout regarding anticipated muscular soreness following the isometric test and strategies for management were reviewed with patient including stretching, using ice and scheduled rest.   - Patient received education on the [facility] program, purpose of the isometric test, progression of neck strengthening as well as wellness approach and systemic strengthening.  Details of the program were discussed.  Reviewed that patient should feel support/pressure from med ex restraints but no pain or discomfort and patient expressed understanding.      Beverley received cold pack for 10 minutes to neck.    Assessment   Beverley is a 57 y.o.  female referred to [facility] with a medical diagnosis of   M54.2 (ICD-10-CM) - Cervicalgia   M47.812 (ICD-10-CM) - Cervical spondylosis     Pt presents with intermittent but daily and ongoing neck dominant pain  that is reproduced sitting, turning, neck movements and reduced with possibly with neck retractions and neck posture correction indicating directional preference of neck retraction, 1 REP.   Upon physical assessment, pt demonstrates reduced neck mobility, tight neck musculature, and thoracic  mobility deficits.  Per cervical MedX testing, pt was 54% below average in strength when compared to those of  same age/height/weight/gender. All of the above noted supports potential cervical  classification as a pattern 1 REP with recurrent/ or consistent symptoms, thus pt is a good candidate for the [facility] Program. Pt would benefit from neck and trunk mobility training, stability training,  improved cardiovascular and muscular endurance, neuromuscular re-education for posture, coordination, and muscular recruitment and education on positional offloading techniques to decrease the intensity and frequency of flare-ups.    Pain Pattern: 1 REP       Pt prognosis is Good.   Pt will benefit from skilled outpatient Physical Therapy to address the deficits stated above and in the chart below, provide pt/family education, and to maximize pt's level of independence.     Plan of care discussed with patient: Yes  Pt's spiritual, cultural and educational needs considered and patient is agreeable to the plan of care and goals as stated below:     Anticipated Barriers for therapy: nil    PT Evaluation Completed? Yes    Medical necessity is demonstrated by the following problem list.    Pt presents with the following impairments:     History  Co-morbidities and personal factors that may impact the plan of care Co-morbidities:   history of CA with reconstructive surgery, chronic pain    Personal Factors:   age      moderate   Examination  Body Structures and Functions, activity limitations and participation restrictions that may impact the plan of care Body Regions:   neck  upper extremities    Body Systems:    ROM  strength  gross coordinated movement    Participation Restrictions:   nil    Activity limitations:   Learning and applying knowledge  no deficits    General Tasks and Commands  no deficits    Communication  no deficits    Mobility  lifting and carrying objects  moving around using equipment (WC)  driving (bike, car, motorcycle)    Self care  washing oneself (bathing, drying, washing hands)  looking after one's health    Domestic Life  doing house work (cleaning house, washing dishes, laundry)    Interactions/Relationships  no deficits    Life Areas  no deficits    Community and Social Life  no deficits         moderate   Clinical Presentation evolving clinical presentation with changing clinical characteristics moderate   Decision Making/ Complexity Score: moderate       GOALS: Pt is in agreement with the following goals.    Short term goals: 6 weeks or 10 visits   1.  Pt will demonstratte increased cervical ROM as measured by med ex by 6 degrees from initial test which results in improved  ROM of neck for ease with ADLs and driving  (approp and ongoing)  2. Pt will demonstrate independence with reducing or controlling symptoms with ther ex, movement, or position independently, able to reduce pain 1-2 points on pain scale using strategies taught in therapy  (approp and ongoing)  3. Pt will demonstrate increased MedX average isometric strength value by 20% with  when compared to the initial testing resulting in improved ability to perform bending, lifting, and carrying activities safely, confidently.  (approp and ongoing)        Long term goals: 10 weeks or 20 visits  1. Pt will demonstratte increased cervical ROM as measured by med ex by 12 degrees from initial test which results in functional ROM of neck for  "ease with ADLs and driving  (approp and ongoing)  2. Pt will demonstrate increased MedX average isometric strength value  by 40% from initial test to improve ability to lift and carry, and sustain good posture while performing ADL's  (approp and ongoing)  3.Pt will demonstrate reduced pain and improved functional outcomes as reported on the FOTO by reaching a limitation score of < or = 30%% or less in order to demonstrate subjective improvement in pt's condition.    (approp and ongoing)  4. Pt will demonstrate independence with reducing or controlling symptoms with ther ex, movement, or position independently, able to reduce pain 2-4 points on pain scale using strategies taught in therapy  (approp and ongoing)  5. Pt will demonstrate independence with the HEP at discharge.   (approp and ongoing)  6.  Pts goals: improve sleep, only be woken once by neck pain (currently wakes her 3-4/night at eval), reduce over all pain, be more physical   (approp and ongoing)    Plan   Outpatient physical therapy 2x week for 10 weeks or 20 visits to include the following:   - Patient education  - Therapeutic exercise  - Manual therapy  - Performance testing   - Neuromuscular Re-education  - Therapeutic activity   - Modalities    Pt may be seen by PTA as part of the rehabilitation team.     Therapist: Makayla Ortega, PT  4/5/2022      "I certify the need for these services furnished under this plan of treatment and while under my care."    ____________________________________  Physician/Referring Practitioner    _______________  Date of Signature          "

## 2022-04-11 NOTE — PROGRESS NOTES
[facility] Physical Therapy Treatment      Name: Beverley Porter  Clinic Number: [id number]    Therapy Diagnosis:   Encounter Diagnoses   Name Primary?    Decreased range of motion of neck Yes    Neck muscle weakness      Physician: Raisa Rubin DO    Visit Date: 2022    Physician Orders: PT Eval and Treat    Medical Diagnosis from Referral:   M54.2 (ICD-10-CM) - Cervicalgia   M47.812 (ICD-10-CM) - Cervical spondylosis         Evaluation Date: 2022  Authorization Period Expiration: 3/17/23  Plan of Care Expiration: 22  Reassessment Due: 22  Visit # / Visits authorized:       Time In: 2:30 pm  Time Out: 3:30 pm  Total Billable Time: 60 minutes     Precautions:  Breast CA- bilat mastectomy with chemo, and abdominal and thigh tissue, not back        Pattern of pain determined: 1            Subjective   Beverley reports she feels ok.  No real soreness after last visit.  She did order a lumbar roll, and got it, but hasn't used it yet.  She didn't really do her exercises much.  She will try  To do them.  Tolerated visit well      Patient reports tolerating previous visit well with minimal soreness  Patient reports their pain to be  0 /10 on a 0-10 scale at rest but 7/10 with rotation with 0 being no pain and 10 being the worst pain imaginable.  Pain Location:  neck     Occupation: retired from school system  Leisure: she has tried to exercise, she joined [employer]      Pts goals: improve sleep, only be woken once by neck pain (currently wakes her 3-4/night at eval), reduce over all pain, be more physical       Objective        Baseline Isometric Testing on Med X equipment:  Testing administered by PT  Date of testin22  ROM 36-90  deg   Max Peak Torque 109    Min Peak Torque 62    Flex/Ext Ratio 1.4/1   % below normative data -54%            Outcomes:  Initial score:  47% limited  Visit 5 score:  Goal:  < 30% limited      Treatment    Pt was instructed in and performed the following:      Beverley received therapeutic exercises to develop/improved posture, cardiovascular endurance, muscular endurance, cervical ROM, strength and muscular endurance for 60 minutes including the following exercises:     Neck retractions supine with towel 10 reps  Neck retractions seated 10 reps  Neck retraction ext 5 reps  Thoracic ext seated 10 reps  Open books 10 reps  scap retraction 10 reps  Neck rotation with towel for snag 10 reps    [facility] 4/12/2022   Visit Number 2   VAS Pain Rating 0   Treadmill Time (in min.) 6   Speed 2   Cervical Stretches - Retraction In Lying 10   Retraction in Sitting 10   Retraction with Extension 10   Rotation 10   Scapular Retraction 10   Cervical Extension Seat Pad -   Seat Adjustment -   Top Dead Center -   Counterweight -   Cervical Flexion -   Cervical Extension -   Cervical Peak Torque -   Cervical Weight 81   Repetitions 15   Rating of Perceived Exertion 5   Ice - Z Lie (in min.) 10         Peripheral muscle strengthening which included 1 set of 15-20 repetitions at a slow, controlled 10-13 second per rep pace focused on strengthening supporting musculature for improved body mechanics and functional mobility.  Pt and therapist focused on proper form during treatment to ensure optimal strengthening of each targeted muscle group.  Machines were utilized including torso rotation, chest press, rowing, biceps, and triceps. Leg extension, leg curl, hip abd, hip add, and leg press added visit 3       Beverley received the following manual therapy techniques: Myofacial release and Soft tissue Mobilization were applied to the: neck for sub occipital release and mobs for neck rotation and side bene for 8 minutes.       Home Exercises Provided and Patient Education Provided   Stretching: Stretching:  Neck retractions seated and supine with towel 3/day 10 reps  scap retraction 3/day  Open books 2/day  Neck rotation with towel  Progress to retraction ext as tolerated, thoracic  extension, incorporate lateral movements and rotation  Progress to thoracic mobility and scap strengthening  Also consider diaphragmatic breathing   Strengthening: not yet started  Cardio program (V5): she is walking regularly  Lifting education (V11):not yet started  Using Lumbar Roll:recommended    Education provided:   - program protocol and principles, increasing weight at 20 reps after slow building from 15-20 reps, safely, slowly, using correct muscular groups  -watching posture  -use of ice    Written Home Exercises Provided: Patient instructed to cont prior HEP.  Exercises were reviewed and Beverley was able to demonstrate them prior to the end of the session.  Beverley demonstrated good  understanding of the education provided.     See EMR under Patient Instructions for exercises provided 4/12/2022.          Assessment       Patient is making good progress towards established goals.   She tolerated visit well with intro to treadmill, stretching, medx neck strengthening at 81 in lbs, 15 reps, rated it a 5, and intro to peripheral machines.  Stressed to her to let us know if she has any difficulty with there ex.  She tolerated visit well and noted it felt good to exercise again.  Pt will continue to benefit from skilled outpatient physical therapy to address the deficits stated in the impairment chart, provide pt/family education and to maximize pt's level of independence in the home and community environment.     Anticipated Barriers for therapy: nil  Pt's spiritual, cultural and educational needs considered and pt agreeable to plan of care and goals as stated below:     GOALS: Pt is in agreement with the following goals.     Short term goals: 6 weeks or 10 visits   1.  Pt will demonstratte increased cervical ROM as measured by med ex by 6 degrees from initial test which results in improved  ROM of neck for ease with ADLs and driving  (approp and ongoing)  2. Pt will demonstrate independence with reducing or  controlling symptoms with ther ex, movement, or position independently, able to reduce pain 1-2 points on pain scale using strategies taught in therapy  (approp and ongoing)  3. Pt will demonstrate increased MedX average isometric strength value by 20% with  when compared to the initial testing resulting in improved ability to perform bending, lifting, and carrying activities safely, confidently.  (approp and ongoing)           Long term goals: 10 weeks or 20 visits  1. Pt will demonstratte increased cervical ROM as measured by med ex by 12 degrees from initial test which results in functional ROM of neck for ease with ADLs and driving  (approp and ongoing)  2. Pt will demonstrate increased MedX average isometric strength value  by 40% from initial test to improve ability to lift and carry, and sustain good posture while performing ADL's  (approp and ongoing)  3.Pt will demonstrate reduced pain and improved functional outcomes as reported on the FOTO by reaching a limitation score of < or = 30%% or less in order to demonstrate subjective improvement in pt's condition.    (approp and ongoing)  4. Pt will demonstrate independence with reducing or controlling symptoms with ther ex, movement, or position independently, able to reduce pain 2-4 points on pain scale using strategies taught in therapy  (approp and ongoing)  5. Pt will demonstrate independence with the HEP at discharge.   (approp and ongoing)  6.  Pts goals: improve sleep, only be woken once by neck pain (currently wakes her 3-4/night at eval), reduce over all pain, be more physical   (approp and ongoing)          Plan   Continue with established Plan of Care towards established PT goals.     Makayla Ortega  PT  4/12/22

## 2022-04-12 ENCOUNTER — CLINICAL SUPPORT (OUTPATIENT)
Dept: REHABILITATION | Facility: HOSPITAL | Age: 57
End: 2022-04-12
Payer: COMMERCIAL

## 2022-04-12 DIAGNOSIS — R29.898 DECREASED RANGE OF MOTION OF NECK: Primary | ICD-10-CM

## 2022-04-12 DIAGNOSIS — M53.82 NECK MUSCLE WEAKNESS: ICD-10-CM

## 2022-04-12 PROCEDURE — 97110 THERAPEUTIC EXERCISES: CPT | Mod: PN | Performed by: PHYSICAL MEDICINE & REHABILITATION

## 2022-04-12 NOTE — PATIENT INSTRUCTIONS
Thoracic Rotation - Open Book      To start, lie on your side with your knees bent.   Keep your arms out in front of you, head relaxed on pillow.   Allow your top arm to reach up towards the ceiling and then behind you, follow with your head, but keep head on pillow.   Breathe deeply. You should feel comfortable and relaxed in this position.  Do not push through pain.  Hold for 2-5 seconds.  Return to the starting position, then repeat.    Repeat 5-10 times.  Switch sides    Powered by [email or web address]

## 2022-04-19 ENCOUNTER — CLINICAL SUPPORT (OUTPATIENT)
Dept: REHABILITATION | Facility: HOSPITAL | Age: 57
End: 2022-04-19
Payer: COMMERCIAL

## 2022-04-19 DIAGNOSIS — M53.82 NECK MUSCLE WEAKNESS: ICD-10-CM

## 2022-04-19 DIAGNOSIS — R29.898 DECREASED RANGE OF MOTION OF NECK: Primary | ICD-10-CM

## 2022-04-19 PROCEDURE — 97110 THERAPEUTIC EXERCISES: CPT | Mod: PN | Performed by: PHYSICAL MEDICINE & REHABILITATION

## 2022-04-19 NOTE — PATIENT INSTRUCTIONS
RETRACTION / CHIN TUCK      10 reps        CERVICAL EXTENSION    10 reps after chin tucks

## 2022-04-19 NOTE — PROGRESS NOTES
[facility]      Name: Beverley Porter  Clinic Number: [id number]    Therapy Diagnosis:   Encounter Diagnoses   Name Primary?    Decreased range of motion of neck Yes    Neck muscle weakness      Physician: Raisa Rubin DO    Visit Date: 2022    Physician Orders: PT Eval and Treat    Medical Diagnosis from Referral:   M54.2 (ICD-10-CM) - Cervicalgia   M47.812 (ICD-10-CM) - Cervical spondylosis         Evaluation Date: 2022  Authorization Period Expiration: 3/17/23  Plan of Care Expiration: 22  Reassessment Due: 22  Visit # / Visits authorized:  3/20     Time In: 1:30 pm  Time Out: 2:45 pm  Total Billable Time: 65 minutes     Precautions:  Breast CA- bilat mastectomy with chemo, and abdominal and thigh tissue, not back        Pattern of pain determined: 1            Subjective   Beverley reports she feels ok.  No real soreness after last visit.  She did order a lumbar roll.  She has done her stretches.  No pain at rest.  She did feel better after stretching and realized she needs the stretching. Tolerated visit well      Patient reports tolerating previous visit well with no real soreness soreness  Patient reports their pain to be  0 /10 on a 0-10 scale at rest but 7/10 with rotation with 0 being no pain and 10 being the worst pain imaginable.  Pain Location:  neck     Occupation: retired from school system  Leisure: she has tried to exercise, she joined [employer]      Pts goals: improve sleep, only be woken once by neck pain (currently wakes her 3-4/night at eval), reduce over all pain, be more physical       Objective        Baseline Isometric Testing on Med X equipment:  Testing administered by PT  Date of testin22  ROM 36-90  deg   Max Peak Torque 109    Min Peak Torque 62    Flex/Ext Ratio 1.4/1   % below normative data -54%            Outcomes:  Initial score:  47% limited  Visit 5 score:  Goal:  < 30% limited      Treatment    Pt was instructed in  and performed the following:     Beverley received therapeutic exercises to develop/improved posture, cardiovascular endurance, muscular endurance, cervical ROM, strength and muscular endurance for 65 minutes including the following exercises:     Neck retractions 10 reps  Neck retraction ext 10 reps with towel  Thoracic ext over chair with foam roller 10 reps  Thoracic ext with therapist over pressure 10 reps  Seated  Quadruped thoracic rotation with therapist over pressure 10 reps  Open books 10 reps  scap retraction 10 reps  Supine on bolster neck retractions 10, pelvic tilts 10, arms over head and stretching each arm 10 reps, alternating legs straight    [facility] 4/19/2022   Visit Number 3   VAS Pain Rating 0   Treadmill Time (in min.) 10   Speed 2.2   Cervical Stretches - Retraction In Lying 10   Retraction in Sitting 10   Retraction with Extension 10   Rotation -   Scapular Retraction -   Cervical Extension Seat Pad -   Seat Adjustment -   Top Dead Center -   Counterweight -   Cervical Flexion -   Cervical Extension -   Cervical Peak Torque -   Cervical Weight 81   Repetitions 20   Rating of Perceived Exertion 5   Ice - Z Lie (in min.) 10           Peripheral muscle strengthening which included 1 set of 15-20 repetitions at a slow, controlled 10-13 second per rep pace focused on strengthening supporting musculature for improved body mechanics and functional mobility.  Pt and therapist focused on proper form during treatment to ensure optimal strengthening of each targeted muscle group.  Machines were utilized including torso rotation, chest press, rowing, biceps, and triceps. Leg extension, leg curl, hip abd, hip add, and leg press added visit 3       Beverley received the following manual therapy techniques: Myofacial release and Soft tissue Mobilization were applied to the: neck for sub occipital release and mobs for neck rotation and side bene for 8 minutes.       Home Exercises Provided and Patient  Education Provided   Stretching: Stretching:  Neck retractions seated and supine with towel 3/day 10 reps  Neck retraction ext 10 reps   Thoracic extension seated  Supine lie pec stretch, arms over head and leg straight alternating for stretch  Quadruped thoracic rotation  scap retraction 3/day  Open books 2/day  Neck rotation with towel  Progress to retraction ext as tolerated, thoracic extension, incorporate lateral movements and rotation  Progress to thoracic mobility and scap strengthening  Also consider diaphragmatic breathing   Strengthening: not yet started  Cardio program (V5): she is walking regularly  Lifting education (V11):not yet started  Using Lumbar Roll:recommended    Education provided:   - program protocol and principles, increasing weight at 20 reps after slow building from 15-20 reps, safely, slowly, using correct muscular groups  -watching posture  -progression of HEP  To incorporate stretching of thoracic spine  -use of ice    Written Home Exercises Provided: Patient instructed to cont prior HEP.  Exercises were reviewed and Beverley was able to demonstrate them prior to the end of the session.  Beverley demonstrated good  understanding of the education provided.     See EMR under Patient Instructions for exercises provided 4/19/22          Assessment       Patient is making good progress towards established goals.   She tolerated visit well with  treadmill, stretching, medx neck strengthening at 81 in lbs, 20 reps, rated it a 5, and continued UE machines and intro to LE peripheral machines.  Stressed to her to let us know if she has any difficulty with there ex.  She tolerated visit well and noted it felt good to exercise again.  We progressed HEP to incorporate further stretching to assist with regaining range and function  Pt will continue to benefit from skilled outpatient physical therapy to address the deficits stated in the impairment chart, provide pt/family education and to maximize pt's level  of independence in the home and community environment.     Anticipated Barriers for therapy: nil  Pt's spiritual, cultural and educational needs considered and pt agreeable to plan of care and goals as stated below:     GOALS: Pt is in agreement with the following goals.     Short term goals: 6 weeks or 10 visits   1.  Pt will demonstratte increased cervical ROM as measured by med ex by 6 degrees from initial test which results in improved  ROM of neck for ease with ADLs and driving  (approp and ongoing)  2. Pt will demonstrate independence with reducing or controlling symptoms with ther ex, movement, or position independently, able to reduce pain 1-2 points on pain scale using strategies taught in therapy  (approp and ongoing)  3. Pt will demonstrate increased MedX average isometric strength value by 20% with  when compared to the initial testing resulting in improved ability to perform bending, lifting, and carrying activities safely, confidently.  (approp and ongoing)           Long term goals: 10 weeks or 20 visits  1. Pt will demonstratte increased cervical ROM as measured by med ex by 12 degrees from initial test which results in functional ROM of neck for ease with ADLs and driving  (approp and ongoing)  2. Pt will demonstrate increased MedX average isometric strength value  by 40% from initial test to improve ability to lift and carry, and sustain good posture while performing ADL's  (approp and ongoing)  3.Pt will demonstrate reduced pain and improved functional outcomes as reported on the FOTO by reaching a limitation score of < or = 30%% or less in order to demonstrate subjective improvement in pt's condition.    (approp and ongoing)  4. Pt will demonstrate independence with reducing or controlling symptoms with ther ex, movement, or position independently, able to reduce pain 2-4 points on pain scale using strategies taught in therapy  (approp and ongoing)  5. Pt will demonstrate independence with the  HEP at discharge.   (approp and ongoing)  6.  Pts goals: improve sleep, only be woken once by neck pain (currently wakes her 3-4/night at eval), reduce over all pain, be more physical   (approp and ongoing)          Plan   Continue with established Plan of Care towards established PT goals.     Makayla Ortega  PT  4/19/22

## 2022-04-21 ENCOUNTER — CLINICAL SUPPORT (OUTPATIENT)
Dept: REHABILITATION | Facility: HOSPITAL | Age: 57
End: 2022-04-21
Payer: COMMERCIAL

## 2022-04-21 DIAGNOSIS — R29.898 DECREASED RANGE OF MOTION OF NECK: Primary | ICD-10-CM

## 2022-04-21 DIAGNOSIS — M53.82 NECK MUSCLE WEAKNESS: ICD-10-CM

## 2022-04-21 PROCEDURE — 97110 THERAPEUTIC EXERCISES: CPT | Mod: PN,CQ

## 2022-04-21 NOTE — PROGRESS NOTES
[facility]      Name: Beverley Porter  Clinic Number: [id number]    Therapy Diagnosis:   Encounter Diagnoses   Name Primary?    Decreased range of motion of neck Yes    Neck muscle weakness      Physician: Raisa Rubin DO    Visit Date: 2022    Physician Orders: PT Eval and Treat    Medical Diagnosis from Referral:   M54.2 (ICD-10-CM) - Cervicalgia   M47.812 (ICD-10-CM) - Cervical spondylosis         Evaluation Date: 2022  Authorization Period Expiration: 3/17/23  Plan of Care Expiration: 22  Reassessment Due: 22  Visit # / Visits authorized:       Time In: 0837AM  Time Out: 0937AM  Total Billable Time: 60 minutes     Precautions:  Breast CA- bilat mastectomy with chemo, and abdominal and thigh tissue, not back        Pattern of pain determined: 1      Subjective   Beverley reports her neck pain isn't too bad today. She's been doing her exercises at home, but admits she can do them a bit more. Her last session helped her a lot, but she still struggles sleeping.     Patient reports tolerating previous visit well with no real soreness soreness  Patient reports their pain to be  3/10 on a 0-10 scale  with 0 being no pain and 10 being the worst pain imaginable.  Pain Location:  neck     Occupation: retired from school system  Leisure: she has tried to exercise, she joined [employer]      Pts goals: improve sleep, only be woken once by neck pain (currently wakes her 3-4/night at eval), reduce over all pain, be more physical       Objective        Baseline Isometric Testing on Med X equipment:  Testing administered by PT  Date of testin22  ROM 36-90  deg   Max Peak Torque 109    Min Peak Torque 62    Flex/Ext Ratio 1.4/1   % below normative data -54%          Outcomes:  Initial score:  47% limited  Visit 5 score:  Goal:  < 30% limited      Treatment    Pt was instructed in and performed the following:     Beverley received therapeutic exercises to  "develop/improved posture, cardiovascular endurance, muscular endurance, cervical ROM, strength and muscular endurance for 6 minutes including the following exercises:     NuStep 7 min  Thoracic ext over chair with foam roller 10 reps  Scap retraction 10 reps  Neck retractions 10 reps  Neck retraction ext 10 reps with towel  +Seated prayer stretch @ EOM 10x5" hold  Open books 10 reps      [facility] 4/21/2022   Visit Number 4   VAS Pain Rating 3   Treadmill Time (in min.) -   Speed -   Time 7   Cervical Stretches - Retraction In Lying -   Retraction in Sitting 10   Retraction with Extension 10   Rotation -   Scapular Retraction 10   Cervical Extension Seat Pad -   Seat Adjustment -   Top Dead Center -   Counterweight -   Cervical Flexion -   Cervical Extension -   Cervical Peak Torque -   Cervical Weight 81   Repetitions 20   Rating of Perceived Exertion 3   Ice - Z Lie (in min.) 10         Peripheral muscle strengthening which included 1 set of 15-20 repetitions at a slow, controlled 10-13 second per rep pace focused on strengthening supporting musculature for improved body mechanics and functional mobility.  Pt and therapist focused on proper form during treatment to ensure optimal strengthening of each targeted muscle group.  Machines were utilized including torso rotation, chest press, rowing, biceps, and triceps. Leg extension, leg curl, hip abd, hip add, and leg press added visit 3      Not performed 4/21/22:  Thoracic ext with therapist over pressure 10 reps  Seated  Quadruped thoracic rotation with therapist over pressure 10 reps  Supine on bolster neck retractions 10, pelvic tilts 10, arms over head and stretching each arm 10 reps, alternating legs straight    Beverley received the following manual therapy techniques: Myofacial release and Soft tissue Mobilization were applied to the: neck for sub occipital release and mobs for neck rotation and side bene for 00 minutes.       Home Exercises Provided and " Patient Education Provided   Stretching: Stretching:  Neck retractions seated and supine with towel 3/day 10 reps  Neck retraction ext 10 reps   Thoracic extension seated  Supine lie pec stretch, arms over head and leg straight alternating for stretch  Quadruped thoracic rotation  scap retraction 3/day  Open books 2/day  Neck rotation with towel  Progress to retraction ext as tolerated, thoracic extension, incorporate lateral movements and rotation  Progress to thoracic mobility and scap strengthening  Also consider diaphragmatic breathing   Strengthening: not yet started  Cardio program (V5): she is walking regularly  Lifting education (V11):not yet started  Using Lumbar Roll:recommended    Education provided:   - program protocol and principles, increasing weight at 20 reps after slow building from 15-20 reps, safely, slowly, using correct muscular groups  -watching posture  -progression of HEP  To incorporate stretching of thoracic spine  -use of ice    Written Home Exercises Provided: Patient instructed to cont prior HEP.  Exercises were reviewed and Beverley was able to demonstrate them prior to the end of the session.  Beverley demonstrated good  understanding of the education provided.     See EMR under Patient Instructions for exercises provided 4/19/22          Assessment   Beverley tolerated the above tx session well without any c/o pain. Pt presents with decreased symptoms, and reports that last session really helped her. She's mainly having pain at night 2/2 uncomfortable positioning. Continued with most therEx from previous session, and there were no adverse effects. MedX machine maintained at 81 in lbs this date, but plan to increase NV. Pt performed 20 reps at an REP 3/10, and all peripheral machines without issue.     Patient is making good progress towards established goals.      Pt will continue to benefit from skilled outpatient physical therapy to address the deficits stated in the impairment chart, provide  pt/family education and to maximize pt's level of independence in the home and community environment.     Anticipated Barriers for therapy: nil  Pt's spiritual, cultural and educational needs considered and pt agreeable to plan of care and goals as stated below:     GOALS: Pt is in agreement with the following goals.     Short term goals: 6 weeks or 10 visits   1.  Pt will demonstratte increased cervical ROM as measured by med ex by 6 degrees from initial test which results in improved  ROM of neck for ease with ADLs and driving  (approp and ongoing)  2. Pt will demonstrate independence with reducing or controlling symptoms with ther ex, movement, or position independently, able to reduce pain 1-2 points on pain scale using strategies taught in therapy  (approp and ongoing)  3. Pt will demonstrate increased MedX average isometric strength value by 20% with  when compared to the initial testing resulting in improved ability to perform bending, lifting, and carrying activities safely, confidently.  (approp and ongoing)           Long term goals: 10 weeks or 20 visits  1. Pt will demonstratte increased cervical ROM as measured by med ex by 12 degrees from initial test which results in functional ROM of neck for ease with ADLs and driving  (approp and ongoing)  2. Pt will demonstrate increased MedX average isometric strength value  by 40% from initial test to improve ability to lift and carry, and sustain good posture while performing ADL's  (approp and ongoing)  3.Pt will demonstrate reduced pain and improved functional outcomes as reported on the FOTO by reaching a limitation score of < or = 30%% or less in order to demonstrate subjective improvement in pt's condition.    (approp and ongoing)  4. Pt will demonstrate independence with reducing or controlling symptoms with ther ex, movement, or position independently, able to reduce pain 2-4 points on pain scale using strategies taught in therapy  (approp and  ongoing)  5. Pt will demonstrate independence with the HEP at discharge.   (approp and ongoing)  6.  Pts goals: improve sleep, only be woken once by neck pain (currently wakes her 3-4/night at eval), reduce over all pain, be more physical   (approp and ongoing)          Plan   Continue with established Plan of Care towards established PT goals.     Michelle Dang, PTA  04/21/2022

## 2022-04-26 NOTE — PROGRESS NOTES
[facility]      Name: Beverley Porter  Clinic Number: [id number]    Therapy Diagnosis:   Encounter Diagnoses   Name Primary?    Decreased range of motion of neck Yes    Neck muscle weakness      Physician: Raisa Rubin DO    Visit Date: 2022    Physician Orders: PT Eval and Treat    Medical Diagnosis from Referral:   M54.2 (ICD-10-CM) - Cervicalgia   M47.812 (ICD-10-CM) - Cervical spondylosis         Evaluation Date: 2022  Authorization Period Expiration: 3/17/23  Plan of Care Expiration: 22  Reassessment Due: 22  Visit # / Visits authorized:       Time In: 0745AM  Time Out: 0909AM  Total Billable Time: 84 minutes     Precautions:  Breast CA- bilat mastectomy with chemo, and abdominal and thigh tissue, not back        Pattern of pain determined: 1      Subjective   Beverley reports her neck pain isn't too bad today. She's been doing her exercises at home, but admits she can do them a bit more. Her last session helped her a lot, but she still struggles sleeping.     Patient reports tolerating previous visit well with no real soreness soreness  Patient reports their pain to be  5/10 on a 0-10 scale  with 0 being no pain and 10 being the worst pain imaginable.  Pain Location:  neck     Occupation: retired from school system  Leisure: she has tried to exercise, she joined [employer]      Pts goals: improve sleep, only be woken once by neck pain (currently wakes her 3-4/night at eval), reduce over all pain, be more physical       Objective        Baseline Isometric Testing on Med X equipment:  Testing administered by PT  Date of testin22  ROM 36-90  deg   Max Peak Torque 109    Min Peak Torque 62    Flex/Ext Ratio 1.4/1   % below normative data -54%          Outcomes:  Initial score:  47% limited  Visit 5 score:  Goal:  < 30% limited      Treatment    Pt was instructed in and performed the following:     Beverley received therapeutic exercises to  "develop/improved posture, cardiovascular endurance, muscular endurance, cervical ROM, strength and muscular endurance for 84 minutes including the following exercises:     NuStep 7 min  Thoracic ext over chair with foam roller 10 reps  Scap retraction 10 reps  Neck retractions 10 reps  Neck retraction ext 10 reps with towel  +Seated prayer stretch @ EOM 10x5" hold  Open books 10 reps    [facility] 4/28/2022   Visit Number 5   VAS Pain Rating 5   Treadmill Time (in min.) -   Speed -   Time 7   Cervical Stretches - Retraction In Lying -   Retraction in Sitting 10   Retraction with Extension 10   Rotation -   Scapular Retraction 10   Manual Therapy 7   Cervical Extension Seat Pad -   Seat Adjustment -   Top Dead Center -   Counterweight -   Cervical Flexion -   Cervical Extension -   Cervical Peak Torque -   Cervical Weight 81   Repetitions 20   Rating of Perceived Exertion 3   Ice - Z Lie (in min.) -         Peripheral muscle strengthening which included 1 set of 15-20 repetitions at a slow, controlled 10-13 second per rep pace focused on strengthening supporting musculature for improved body mechanics and functional mobility.  Pt and therapist focused on proper form during treatment to ensure optimal strengthening of each targeted muscle group.  Machines were utilized including torso rotation, chest press, rowing, biceps, and triceps. Leg extension, leg curl, hip abd, hip add, and leg press added visit 3      Not performed 4/21/22:  Thoracic ext with therapist over pressure 10 reps  Seated  Quadruped thoracic rotation with therapist over pressure 10 reps  Supine on bolster neck retractions 10, pelvic tilts 10, arms over head and stretching each arm 10 reps, alternating legs straight    Beverley received the following manual therapy techniques: Myofacial release and Soft tissue Mobilization were applied to the neck for 7 minutes.  Sub occipital release   STM to cervical paraspinals, B UT      Home Exercises " Provided and Patient Education Provided   Stretching: Stretching:  Neck retractions seated and supine with towel 3/day 10 reps  Neck retraction ext 10 reps   Thoracic extension seated  Supine lie pec stretch, arms over head and leg straight alternating for stretch  Quadruped thoracic rotation  scap retraction 3/day  Open books 2/day  Neck rotation with towel  Progress to retraction ext as tolerated, thoracic extension, incorporate lateral movements and rotation  Progress to thoracic mobility and scap strengthening  Also consider diaphragmatic breathing   Strengthening: not yet started  Cardio program (V5): she is walking regularly  Lifting education (V11):not yet started  Using Lumbar Roll:recommended    Education provided:   - program protocol and principles, increasing weight at 20 reps after slow building from 15-20 reps, safely, slowly, using correct muscular groups  -watching posture  -progression of HEP  To incorporate stretching of thoracic spine  -use of ice    Written Home Exercises Provided: Patient instructed to cont prior HEP.  Exercises were reviewed and Beverley was able to demonstrate them prior to the end of the session.  Beverley demonstrated good  understanding of the education provided.     See EMR under Patient Instructions for exercises provided 4/19/22          Assessment   Beverley tolerated the above tx session well without any c/o pain. Pt presents with increased symptoms 2/2 to the way she slept last night. Continued with therEx from previous session, and there were no adverse effects. Administered MT this date for pain relief and reduction of tissue tension, and pt reported good relief. MedX machine maintained at 81 in lbs this date, but INCREASE  NV. Pt performed 20 reps at an REP 3/10, and all peripheral machines without issue.     Patient is making good progress towards established goals.      Pt will continue to benefit from skilled outpatient physical therapy to address the deficits stated in the  impairment chart, provide pt/family education and to maximize pt's level of independence in the home and community environment.     Anticipated Barriers for therapy: nil  Pt's spiritual, cultural and educational needs considered and pt agreeable to plan of care and goals as stated below:     GOALS: Pt is in agreement with the following goals.     Short term goals: 6 weeks or 10 visits   1.  Pt will demonstratte increased cervical ROM as measured by med ex by 6 degrees from initial test which results in improved  ROM of neck for ease with ADLs and driving  (approp and ongoing)  2. Pt will demonstrate independence with reducing or controlling symptoms with ther ex, movement, or position independently, able to reduce pain 1-2 points on pain scale using strategies taught in therapy  (approp and ongoing)  3. Pt will demonstrate increased MedX average isometric strength value by 20% with  when compared to the initial testing resulting in improved ability to perform bending, lifting, and carrying activities safely, confidently.  (approp and ongoing)           Long term goals: 10 weeks or 20 visits  1. Pt will demonstratte increased cervical ROM as measured by med ex by 12 degrees from initial test which results in functional ROM of neck for ease with ADLs and driving  (approp and ongoing)  2. Pt will demonstrate increased MedX average isometric strength value  by 40% from initial test to improve ability to lift and carry, and sustain good posture while performing ADL's  (approp and ongoing)  3.Pt will demonstrate reduced pain and improved functional outcomes as reported on the FOTO by reaching a limitation score of < or = 30%% or less in order to demonstrate subjective improvement in pt's condition.    (approp and ongoing)  4. Pt will demonstrate independence with reducing or controlling symptoms with ther ex, movement, or position independently, able to reduce pain 2-4 points on pain scale using strategies taught in  therapy  (approp and ongoing)  5. Pt will demonstrate independence with the HEP at discharge.   (approp and ongoing)  6.  Pts goals: improve sleep, only be woken once by neck pain (currently wakes her 3-4/night at eval), reduce over all pain, be more physical   (approp and ongoing)          Plan   Continue with established Plan of Care towards established PT goals.     iMchelle Dang, PTA  04/28/2022

## 2022-04-28 ENCOUNTER — CLINICAL SUPPORT (OUTPATIENT)
Dept: REHABILITATION | Facility: HOSPITAL | Age: 57
End: 2022-04-28
Payer: COMMERCIAL

## 2022-04-28 ENCOUNTER — PATIENT MESSAGE (OUTPATIENT)
Dept: PSYCHIATRY | Facility: CLINIC | Age: 57
End: 2022-04-28
Payer: COMMERCIAL

## 2022-04-28 DIAGNOSIS — R29.898 DECREASED RANGE OF MOTION OF NECK: Primary | ICD-10-CM

## 2022-04-28 DIAGNOSIS — M53.82 NECK MUSCLE WEAKNESS: ICD-10-CM

## 2022-04-28 PROCEDURE — 97110 THERAPEUTIC EXERCISES: CPT | Mod: PN,CQ

## 2022-04-29 ENCOUNTER — PATIENT MESSAGE (OUTPATIENT)
Dept: PSYCHIATRY | Facility: CLINIC | Age: 57
End: 2022-04-29
Payer: COMMERCIAL

## 2022-04-29 ENCOUNTER — OFFICE VISIT (OUTPATIENT)
Dept: PSYCHIATRY | Facility: CLINIC | Age: 57
End: 2022-04-29
Payer: COMMERCIAL

## 2022-04-29 DIAGNOSIS — F41.9 ANXIETY DISORDER, UNSPECIFIED TYPE: ICD-10-CM

## 2022-04-29 DIAGNOSIS — F33.0 MILD EPISODE OF RECURRENT MAJOR DEPRESSIVE DISORDER: Primary | ICD-10-CM

## 2022-04-29 PROCEDURE — 90785 PR INTERACTIVE COMPLEXITY: ICD-10-PCS | Mod: 95,,, | Performed by: SOCIAL WORKER

## 2022-04-29 PROCEDURE — 90837 PSYTX W PT 60 MINUTES: CPT | Mod: 95,,, | Performed by: SOCIAL WORKER

## 2022-04-29 PROCEDURE — 90837 PR PSYCHOTHERAPY W/PATIENT, 60 MIN: ICD-10-PCS | Mod: 95,,, | Performed by: SOCIAL WORKER

## 2022-04-29 PROCEDURE — 90785 PSYTX COMPLEX INTERACTIVE: CPT | Mod: 95,,, | Performed by: SOCIAL WORKER

## 2022-04-29 NOTE — PROGRESS NOTES
"  Cecilia Arciniega, [facility]  Individual Psychotherapy   Beverley Porter,  4/29/2022    Site: [facility] - Dept of Psychiatry    Therapeutic Intervention: Met with patient for individual psychotherapy follow up.    Chief complaint/reason for encounter: depression, anxiety and interpersonal     Content of current session: Met with Beverley for follow up session.    Hasn't had any alcohol since last Saturday; went to drinking-friends' house and drank water.  Beverley says she doesn't really have a reason for this, but then goes on to express not liking who she becomes when drinking and not liking feeling out of control.  Explored friendship with Kim and possible concerns about what happens to that friendship if Beverley doesn't drink.  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  And now?  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  Home marc ongoing, frustrating.  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  Tt will send "What are my Values" worksheet.    FU in 3 weeks          From Previous Session:  Returned to [employer] at [place] a couple of Sundays - sounds apathetic about it,  having been raised Anabaptist.  Has been pondering resuming her devotional practices in the morning and will give some intentional thought to where she would do this in the house (no office space). Discussed communication patterns between Tamiko and Braulio - he often umpires on Sunday and that interferes with some family gatherings - a little annoying for Beverley.  Sometimes likes when he umpires in the evenings because it allows her to go to her friends house and drink.  Did not read boundary info.  Knows she and Braulio need to form a plan for conveying expectations to Kwadwo regarding " lyubov    Joined [employer] and a group fitness class.      FU in 2 weeks.      Treatment plan:  · Target symptoms: depression  · Why chosen therapy is appropriate versus another modality: patient responds to this modality  · Outcome monitoring methods: self-report, observation  · Therapeutic intervention type: insight oriented psychotherapy, supportive psychotherapy    Risk parameters:  Patient reports no suicidal ideation  Patient reports no homicidal ideation  Patient reports no self-injurious behavior  Patient reports no violent behavior      Patient's response to intervention:  The patient's response to intervention is accepting.    Progress toward goals and other mental status changes:  The patient's progress toward goals is  Fair.    Diagnosis:     ICD-10-CM ICD-9-CM   1. Mild episode of recurrent major depressive disorder  F33.0 296.31   2. Anxiety disorder, unspecified type  F41.9 300.00       Plan: Pt plans to continue individual psychotherapy    Return to clinic:  3 weeks    Length of Service (minutes): 60

## 2022-05-09 ENCOUNTER — CLINICAL SUPPORT (OUTPATIENT)
Dept: REHABILITATION | Facility: HOSPITAL | Age: 57
End: 2022-05-09
Payer: COMMERCIAL

## 2022-05-09 DIAGNOSIS — R29.898 DECREASED RANGE OF MOTION OF NECK: Primary | ICD-10-CM

## 2022-05-09 DIAGNOSIS — M53.82 NECK MUSCLE WEAKNESS: ICD-10-CM

## 2022-05-09 PROCEDURE — 97110 THERAPEUTIC EXERCISES: CPT | Mod: PN

## 2022-05-09 NOTE — PROGRESS NOTES
[facility]      Name: Beverley Porter  Clinic Number: [id number]    Therapy Diagnosis:   Encounter Diagnoses   Name Primary?    Decreased range of motion of neck Yes    Neck muscle weakness      Physician: Raisa Rubin DO    Visit Date: 2022    Physician Orders: PT Eval and Treat    Medical Diagnosis from Referral:   M54.2 (ICD-10-CM) - Cervicalgia   M47.812 (ICD-10-CM) - Cervical spondylosis   Evaluation Date: 2022  Authorization Period Expiration: 3/17/23  Plan of Care Expiration: 22  Reassessment Due: 22  Visit # / Visits authorized:       Time In: 1600  Time Out: 1650  Total Billable Time: 50 minutes     Precautions:  Breast CA- bilat mastectomy with chemo, and abdominal and thigh tissue, not back        Pattern of pain determined: 1      Subjective   Beverley reports her neck is doing okay today. 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   [place] fine after last appt. Morning stiffness is still a big issue. When asked about home exercises, pt reports she's doing some stuff at home.     Patient reports tolerating previous visit well with no real soreness soreness  Patient reports their pain to be 3/10 on a 0-10 scale  with 0 being no pain and 10 being the worst pain imaginable.  Pain Location:  neck     Occupation: retired from school system  Leisure: she has tried to exercise, she joined [employer]      Pts goals: improve sleep, only be woken once by neck pain (currently wakes her 3-4/night at eval), reduce over all pain, be more physical       Objective        Baseline Isometric Testing on Med X equipment:  Testing administered by PT  Date of testin22  ROM 36-90  deg   Max Peak Torque 109    Min Peak Torque 62    Flex/Ext Ratio 1.4/1   % below normative data -54%        Outcomes:  Initial score:  47% limited  Visit 5 score:  Goal:  < 30% limited      Treatment    Pt was  "instructed in and performed the following:     Beverley received therapeutic exercises to develop/improved posture, cardiovascular endurance, muscular endurance, cervical/thoracic ROM, strength and muscular endurance for 48 minutes including the following exercises:     NuStep, x7 min    Thoracic ext over chair c/ foam roller, x10  +Trap stretch, 2x30"  +Ball rollouts (center only), x10  Chin tucks, 2x10  Neck retraction-ext c/ towel, x10  Scap retraction c/ GTB*, x20  Standing* open books, x10 ea    *=progression       [facility] 5/9/2022   Visit Number 6   VAS Pain Rating 3   Treadmill Time (in min.) -   Speed -   Time -   Cervical Stretches - Retraction In Lying -   Retraction in Sitting 20   Retraction with Extension 10   Rotation -   Scapular Retraction 20   Manual Therapy -   Cervical Extension Seat Pad -   Seat Adjustment -   Top Dead Center -   Counterweight -   Cervical Flexion -   Cervical Extension -   Cervical Peak Torque -   Cervical Weight 84   Repetitions 15   Rating of Perceived Exertion 3   Ice - Z Lie (in min.) -       Peripheral muscle strengthening which included 1 set of 15-20 repetitions at a slow, controlled 10-13 second per rep pace focused on strengthening supporting musculature for improved body mechanics and functional mobility.  Pt and therapist focused on proper form during treatment to ensure optimal strengthening of each targeted muscle group.  Machines were utilized including torso rotation, chest press, rowing, biceps, and triceps. Leg extension, leg curl, hip abd, hip add, and leg press added visit 3      Not performed 5/9/2022 2/2 lack of time:  Thoracic ext with therapist over pressure 10 reps  Seated  Quadruped thoracic rotation with therapist over pressure 10 reps  Supine on bolster neck retractions 10, pelvic tilts 10, arms over head and stretching each arm 10 reps, alternating legs straight  Seated prayer stretch @ EOM 10x5" hold      Beverley received the following manual " therapy techniques: null      Home Exercises Provided and Patient Education Provided   Stretching: Stretching:  Neck retractions seated and supine with towel 3/day 10 reps  Neck retraction ext 10 reps   Thoracic extension seated  Supine lie pec stretch, arms over head and leg straight alternating for stretch  Quadruped thoracic rotation  scap retraction 3/day  Open books 2/day  Neck rotation with towel  Progress to retraction ext as tolerated, thoracic extension, incorporate lateral movements and rotation  Progress to thoracic mobility and scap strengthening  Also consider diaphragmatic breathing   Strengthening: not yet started  Cardio program (V5): she is walking regularly  Lifting education (V11):not yet started  Using Lumbar Roll:recommended    Education provided:   - program protocol and principles, increasing weight at 20 reps after slow building from 15-20 reps, safely, slowly, using correct muscular groups  -watching posture  -progression of HEP  To incorporate stretching of thoracic spine  -use of ice    Written Home Exercises Provided: Patient instructed to cont prior HEP.  Exercises were reviewed and Beverley was able to demonstrate them prior to the end of the session.  Beverley demonstrated good  understanding of the education provided.     See EMR under Patient Instructions for exercises provided 4/19/22      Assessment   Beverley responded well to today's session. Additional exercises for cervicothoracic mobility incorporated with no exacerbation of pain. Brief reassessment performed c/ pt demonstrating ongoing limitations in cervical range and function. Pt can now tolerate 84 in lbs on cervical MedX, performing 15 reps c/ a reported exertion of 3/10.  PT reinforced importance of HEP consistency for optimal therapeutic gain. Will continue to progress exercise as functional implicated and appropriate. Pt is using a towel roll for sleep but PT encouraged purchasing cervical roll.       Patient is making good progress  towards established goals.    Pt will continue to benefit from skilled outpatient physical therapy to address the deficits stated in the impairment chart, provide pt/family education and to maximize pt's level of independence in the home and community environment.     Anticipated Barriers for therapy: nil  Pt's spiritual, cultural and educational needs considered and pt agreeable to plan of care and goals as stated below:     GOALS: Pt is in agreement with the following goals.     Short term goals: 6 weeks or 10 visits   1.  Pt will demonstratte increased cervical ROM as measured by med ex by 6 degrees from initial test which results in improved  ROM of neck for ease with ADLs and driving  (approp and ongoing)  2. Pt will demonstrate independence with reducing or controlling symptoms with ther ex, movement, or position independently, able to reduce pain 1-2 points on pain scale using strategies taught in therapy  (approp and ongoing)  3. Pt will demonstrate increased MedX average isometric strength value by 20% with  when compared to the initial testing resulting in improved ability to perform bending, lifting, and carrying activities safely, confidently.  (approp and ongoing)           Long term goals: 10 weeks or 20 visits  1. Pt will demonstratte increased cervical ROM as measured by med ex by 12 degrees from initial test which results in functional ROM of neck for ease with ADLs and driving  (approp and ongoing)  2. Pt will demonstrate increased MedX average isometric strength value  by 40% from initial test to improve ability to lift and carry, and sustain good posture while performing ADL's  (approp and ongoing)  3.Pt will demonstrate reduced pain and improved functional outcomes as reported on the FOTO by reaching a limitation score of < or = 30%% or less in order to demonstrate subjective improvement in pt's condition.    (approp and ongoing)  4. Pt will demonstrate independence with reducing or controlling  symptoms with ther ex, movement, or position independently, able to reduce pain 2-4 points on pain scale using strategies taught in therapy  (approp and ongoing)  5. Pt will demonstrate independence with the HEP at discharge.   (approp and ongoing)  6.  Pts goals: improve sleep, only be woken once by neck pain (currently wakes her 3-4/night at eval), reduce over all pain, be more physical   (approp and ongoing)      Plan   Continue with established Plan of Care towards established PT goals.     Shweta Velazquez, PT, DPT  05/09/2022

## 2022-05-16 ENCOUNTER — OFFICE VISIT (OUTPATIENT)
Dept: PSYCHIATRY | Facility: CLINIC | Age: 57
End: 2022-05-16
Payer: COMMERCIAL

## 2022-05-16 ENCOUNTER — PATIENT MESSAGE (OUTPATIENT)
Dept: SPORTS MEDICINE | Facility: CLINIC | Age: 57
End: 2022-05-16
Payer: COMMERCIAL

## 2022-05-16 ENCOUNTER — PATIENT MESSAGE (OUTPATIENT)
Dept: PSYCHIATRY | Facility: CLINIC | Age: 57
End: 2022-05-16

## 2022-05-16 DIAGNOSIS — F33.0 MILD EPISODE OF RECURRENT MAJOR DEPRESSIVE DISORDER: Primary | ICD-10-CM

## 2022-05-16 PROCEDURE — 90837 PR PSYCHOTHERAPY W/PATIENT, 60 MIN: ICD-10-PCS | Mod: 95,,, | Performed by: SOCIAL WORKER

## 2022-05-16 PROCEDURE — 90837 PSYTX W PT 60 MINUTES: CPT | Mod: 95,,, | Performed by: SOCIAL WORKER

## 2022-05-16 NOTE — PROGRESS NOTES
[facility] Back Physical Therapy Treatment      Name: Beverley Porter  Clinic Number: [id number]    Therapy Diagnosis:   Encounter Diagnoses   Name Primary?    Decreased range of motion of neck Yes    Neck muscle weakness      Physician: Raisa Rubin DO    Visit Date: 2022    Physician Orders: PT Eval and Treat    Medical Diagnosis from Referral:   M54.2 (ICD-10-CM) - Cervicalgia   M47.812 (ICD-10-CM) - Cervical spondylosis   Evaluation Date: 2022  Authorization Period Expiration: 3/17/23  Plan of Care Expiration: 22  Reassessment Due: 22  Visit # / Visits authorized:       Time In: 1001  Time Out: 1050AM  Total Billable Time: 49 minutes     Precautions:  Breast CA- bilat mastectomy with chemo, and abdominal and thigh tissue, not back        Pattern of pain determined: 1      Subjective   Beverley reports the last week has been a little rough. It might be how she has been sleeping lately. She hasn't been using the towel under her neck like she should.     Patient reports tolerating previous visit well.   Patient reports their pain to be 5/10 on a 0-10 scale  with 0 being no pain and 10 being the worst pain imaginable.  Pain Location:  neck     Occupation: retired from school system  Leisure: she has tried to exercise, she joined [employer]      Pts goals: improve sleep, only be woken once by neck pain (currently wakes her 3-4/night at eval), reduce over all pain, be more physical       Objective        Baseline Isometric Testing on Med X equipment:  Testing administered by PT  Date of testin22  ROM 36-90  deg   Max Peak Torque 109    Min Peak Torque 62    Flex/Ext Ratio 1.4/1   % below normative data -54%        Outcomes:  Initial score:  47% limited  Visit 5 score:  Goal:  < 30% limited      Treatment    Pt was instructed in and performed the following:     Beverley received therapeutic exercises to develop/improved posture, cardiovascular endurance, muscular endurance,  "cervical/thoracic ROM, strength and muscular endurance for 49 minutes including the following exercises:     NuStep, x7 min    Thoracic ext over chair c/ foam roller, x10  Trap stretch, 2x30"  Ball rollouts (center only), x10  Chin tucks, 2x10  Neck retraction-ext c/ towel, x10  Standing open books, x10 ea  Quadruped thoracic rotation in Emmanuel pose to isolate thoracic x 10    [facility] 5/17/2022   Visit Number 7   VAS Pain Rating 5   Treadmill Time (in min.) -   Speed -   Time 7   Cervical Stretches - Retraction In Lying -   Retraction in Sitting 20   Retraction with Extension 10   Rotation -   Scapular Retraction -   Manual Therapy -   Cervical Extension Seat Pad -   Seat Adjustment -   Top Dead Center -   Counterweight -   Cervical Flexion -   Cervical Extension -   Cervical Peak Torque -   Cervical Weight 84   Repetitions 18   Rating of Perceived Exertion 3   Ice - Z Lie (in min.) -       Peripheral muscle strengthening which included 1 set of 15-20 repetitions at a slow, controlled 10-13 second per rep pace focused on strengthening supporting musculature for improved body mechanics and functional mobility.  Pt and therapist focused on proper form during treatment to ensure optimal strengthening of each targeted muscle group.  Machines were utilized including torso rotation, chest press, rowing, biceps, and triceps. Leg extension, leg curl, hip abd, hip add, and leg press added visit 3      Not performed 5/17/2022 2/2 lack of time:  Thoracic ext with therapist over pressure 10 reps  Seated  Supine on bolster neck retractions 10, pelvic tilts 10, arms over head and stretching each arm 10 reps, alternating legs straight  Seated prayer stretch @ EOM 10x5" hold      Beverley received the following manual therapy techniques: null      Home Exercises Provided and Patient Education Provided   Stretching: Stretching:  Neck retractions seated and supine with towel 3/day 10 reps  Neck retraction ext 10 reps "   Thoracic extension seated  Supine lie pec stretch, arms over head and leg straight alternating for stretch  Quadruped thoracic rotation  scap retraction 3/day  Open books 2/day  Neck rotation with towel  Progress to retraction ext as tolerated, thoracic extension, incorporate lateral movements and rotation  Progress to thoracic mobility and scap strengthening  Also consider diaphragmatic breathing   Strengthening: not yet started  Cardio program (V5): she is walking regularly  Lifting education (V11):not yet started  Using Lumbar Roll:recommended    Education provided:     Written Home Exercises Provided: Patient instructed to cont prior HEP.  Exercises were reviewed and Beverley was able to demonstrate them prior to the end of the session.  Beverley demonstrated good  understanding of the education provided.     See EMR under Patient Instructions for exercises provided 4/19/22      Assessment   Beverley tolerated session well. Continued previously prescribed therex to reinforce HEP without issue. Stressed the importance of attending therapy for therapeutic gain. MedX was performed at 84in lbs with 18 reps performed at an exertion rating of 3/10.       Patient is making good progress towards established goals.    Pt will continue to benefit from skilled outpatient physical therapy to address the deficits stated in the impairment chart, provide pt/family education and to maximize pt's level of independence in the home and community environment.     Anticipated Barriers for therapy: nil  Pt's spiritual, cultural and educational needs considered and pt agreeable to plan of care and goals as stated below:     GOALS: Pt is in agreement with the following goals.     Short term goals: 6 weeks or 10 visits   1.  Pt will demonstratte increased cervical ROM as measured by med ex by 6 degrees from initial test which results in improved  ROM of neck for ease with ADLs and driving  (approp and ongoing)  2. Pt will demonstrate independence  with reducing or controlling symptoms with ther ex, movement, or position independently, able to reduce pain 1-2 points on pain scale using strategies taught in therapy  (approp and ongoing)  3. Pt will demonstrate increased MedX average isometric strength value by 20% with  when compared to the initial testing resulting in improved ability to perform bending, lifting, and carrying activities safely, confidently.  (approp and ongoing)           Long term goals: 10 weeks or 20 visits  1. Pt will demonstratte increased cervical ROM as measured by med ex by 12 degrees from initial test which results in functional ROM of neck for ease with ADLs and driving  (approp and ongoing)  2. Pt will demonstrate increased MedX average isometric strength value  by 40% from initial test to improve ability to lift and carry, and sustain good posture while performing ADL's  (approp and ongoing)  3.Pt will demonstrate reduced pain and improved functional outcomes as reported on the FOTO by reaching a limitation score of < or = 30%% or less in order to demonstrate subjective improvement in pt's condition.    (approp and ongoing)  4. Pt will demonstrate independence with reducing or controlling symptoms with ther ex, movement, or position independently, able to reduce pain 2-4 points on pain scale using strategies taught in therapy  (approp and ongoing)  5. Pt will demonstrate independence with the HEP at discharge.   (approp and ongoing)  6.  Pts goals: improve sleep, only be woken once by neck pain (currently wakes her 3-4/night at eval), reduce over all pain, be more physical   (approp and ongoing)      Plan   Continue with established Plan of Care towards established PT goals.     Marcelino Winchester, PTA  05/17/2022

## 2022-05-16 NOTE — PROGRESS NOTES
Telemedicine Visit  The patient location is: [place]  The chief complaint leading to consultation is: depression    Visit type: audiovisual    Face to Face time with patient: 55  60 minutes of total time spent on the encounter, which includes face to face time and non-face to face time preparing to see the patient (eg, review of tests), Obtaining and/or reviewing separately obtained history, Documenting clinical information in the electronic or other health record, Independently interpreting results (not separately reported) and communicating results to the patient/family/caregiver, or Care coordination (not separately reported).     Each patient to whom he or she provides medical services by telemedicine is:  (1) informed of the relationship between the physician and patient and the respective role of any other health care provider with respect to management of the patient; and (2) notified that he or she may decline to receive medical services by telemedicine and may withdraw from such care at any time.    Notes:     [facility]  Individual Psychotherapy   Beverley Porter,  5/16/2022    Site: [facility] - Dept of Psychiatry    Therapeutic Intervention: Met with patient for individual psychotherapy follow up.    Chief complaint/reason for encounter: depression, anxiety and interpersonal     Content of current session: Met with Beverley for follow up session.   Beverley shares that she has been drinking some.  Focus of session was Beverley's concern about son Kwadwo (25).  His part-time coaching job has ended, doing nothing, sleeps all day, smokes a lot of MJ.  Beverley very tearful and openly vulnerable regarding Kwadwo.  She will not enforce rules or set expectations and this causes further tension between Beverley and Celso.  Proposed to Beverley that Kwadwo sounds very depressed and encouraged pt to try to get him to see his primary care provider (in hopes of a referral to ).      HW:  Beverley will choose one or 2 values to focus one  "each day and take intentional action toward strengthening those values.    FU in 2-3 weeks.              From Previous Session:  Hasn't had any alcohol since last Saturday; went to drinking-friends' house and drank water.  Beverley says she doesn't really have a reason for this, but then goes on to express not liking who she becomes when drinking and not liking feeling out of control.  Explored friendship with Kim and possible concerns about what happens to that friendship if Beverley doesn't drink.  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  And now?  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  Home marc ongoing, frustrating.  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  Tt will send "What are my Values" worksheet.    FU in 3 weeks        Treatment plan:  · Target symptoms: depression  · Why chosen therapy is appropriate versus another modality: patient responds to this modality  · Outcome monitoring methods: self-report, observation  · Therapeutic intervention type: insight oriented psychotherapy, supportive psychotherapy    Risk parameters:  Patient reports no suicidal ideation  Patient reports no homicidal ideation  Patient reports no self-injurious behavior  Patient reports no violent behavior      Patient's response to intervention:  The patient's response to intervention is accepting.    Progress toward goals and other mental status changes:  The patient's progress toward goals is  Fair.    Diagnosis:     ICD-10-CM ICD-9-CM   1. Mild episode of recurrent major depressive disorder  F33.0 296.31       Plan: Pt plans to continue individual psychotherapy    Return to clinic:  3 weeks    Length of Service (minutes): 60        "

## 2022-05-17 ENCOUNTER — CLINICAL SUPPORT (OUTPATIENT)
Dept: REHABILITATION | Facility: HOSPITAL | Age: 57
End: 2022-05-17
Payer: COMMERCIAL

## 2022-05-17 ENCOUNTER — PATIENT MESSAGE (OUTPATIENT)
Dept: SPORTS MEDICINE | Facility: CLINIC | Age: 57
End: 2022-05-17
Payer: COMMERCIAL

## 2022-05-17 DIAGNOSIS — M53.82 NECK MUSCLE WEAKNESS: ICD-10-CM

## 2022-05-17 DIAGNOSIS — R29.898 DECREASED RANGE OF MOTION OF NECK: Primary | ICD-10-CM

## 2022-05-17 PROCEDURE — 97110 THERAPEUTIC EXERCISES: CPT | Mod: PN,CQ

## 2022-05-19 ENCOUNTER — CLINICAL SUPPORT (OUTPATIENT)
Dept: REHABILITATION | Facility: HOSPITAL | Age: 57
End: 2022-05-19
Payer: COMMERCIAL

## 2022-05-19 DIAGNOSIS — R29.898 DECREASED RANGE OF MOTION OF NECK: Primary | ICD-10-CM

## 2022-05-19 DIAGNOSIS — M53.82 NECK MUSCLE WEAKNESS: ICD-10-CM

## 2022-05-19 PROCEDURE — 97110 THERAPEUTIC EXERCISES: CPT | Mod: PN | Performed by: PHYSICAL MEDICINE & REHABILITATION

## 2022-05-19 NOTE — PROGRESS NOTES
[facility] Back Physical Therapy Treatment      Name: Beverley Porter  Clinic Number: [id number]    Therapy Diagnosis:   Encounter Diagnoses   Name Primary?    Decreased range of motion of neck Yes    Neck muscle weakness      Physician: Raisa Rubin DO    Visit Date: 2022    Physician Orders: PT Eval and Treat    Medical Diagnosis from Referral:   M54.2 (ICD-10-CM) - Cervicalgia   M47.812 (ICD-10-CM) - Cervical spondylosis   Evaluation Date: 2022  Authorization Period Expiration: 3/17/23  Plan of Care Expiration: 22  Reassessment Due: 22  Visit # / Visits authorized:      Time In: 1030  Time Out: 1144AM  Total Billable Time: 59 minutes     Precautions:  Breast CA- bilat mastectomy with chemo, and abdominal and thigh tissue, not back        Pattern of pain determined: 1      Subjective   Beverley reports she is ok at rest, pain with movement, but not at rest. She hasn't been using the towel under her neck like she should. She might order a neck roll.   She hasn't been walking, we reviewed this.  She has to get her treadmill out of the garage.    Patient reports tolerating previous visit well.   Patient reports their pain to be 5/10 on a 0-10 scale  with 0 being no pain and 10 being the worst pain imaginable.  Pain Location:  neck     Occupation: retired from school system  Leisure: she has tried to exercise, she joined [employer]      Pts goals: improve sleep, only be woken once by neck pain (currently wakes her 3-4/night at eval), reduce over all pain, be more physical       Objective        Baseline Isometric Testing on Med X equipment:  Testing administered by PT  Date of testin22  ROM 36-90  deg 36-96 on 22   Max Peak Torque 109    Min Peak Torque 62    Flex/Ext Ratio 1.4/1   % below normative data -54%        Outcomes:  Initial score:  47% limited  Visit 10 score:  Goal:  < 30% limited      Treatment    Pt was instructed in and performed the following:     Beverley received  "therapeutic exercises to develop/improved posture, cardiovascular endurance, muscular endurance, cervical/thoracic ROM, strength and muscular endurance for 59 minutes including the following exercises:     Treadmill 6 min, focus on core    Thoracic ext over chair c/ foam roller, x10  Trap stretch, 2x30"  Chin tucks, 2x10  Chin tuck extensions off bed edge trial 3 reps  Neck retraction-ext c/ towel, x10  Standing open books, x10 ea  Side lie open books 10 each  Quadruped thoracic rotation in Emmanuel pose to isolate thoracic x 10  Yellow theraband scap retraction shoulder abd with hands behind back, palms forward    [facility] 5/19/2022   Visit Number 8   VAS Pain Rating 0   Treadmill Time (in min.) 6   Speed 2.7   Time -   Cervical Stretches - Retraction In Lying -   Retraction in Sitting 20   Retraction with Extension 10   Rotation -   Scapular Retraction 20   Manual Therapy -   Cervical Extension Seat Pad -   Seat Adjustment -   Top Dead Center -   Counterweight -   Cervical Flexion -   Cervical Extension -   Cervical Peak Torque -   Cervical Weight 84   Repetitions 20   Rating of Perceived Exertion 4   Ice - Z Lie (in min.) 5       Peripheral muscle strengthening which included 1 set of 15-20 repetitions at a slow, controlled 10-13 second per rep pace focused on strengthening supporting musculature for improved body mechanics and functional mobility.  Pt and therapist focused on proper form during treatment to ensure optimal strengthening of each targeted muscle group.  Machines were utilized including torso rotation, chest press, rowing, biceps, and triceps. Leg extension, leg curl, hip abd, hip add, and leg press added visit 3      Not performed 5/19/2022 :  Supine on bolster neck retractions 10, pelvic tilts 10, arms over head and stretching each arm 10 reps, alternating legs straight  Seated prayer stretch @ EOM 10x5" hold      Beverley received the following manual therapy techniques: Thoracic ext with " therapist over pressure 10 reps  Seated, and supine neck side bend and rotation mobs      Home Exercises Provided and Patient Education Provided   Stretching: Stretching:  Neck retractions seated and supine with towel 3/day 10 reps  Neck retraction ext 10 reps   Thoracic extension seated  Supine lie pec stretch, arms over head and leg straight alternating for stretch  Quadruped thoracic rotation  scap retraction 3/day  Open books 2/day  Neck rotation with towel  Progress to retraction ext as tolerated, thoracic extension, incorporate lateral movements and rotation  Progress to thoracic mobility and scap strengthening  Also consider diaphragmatic breathing   Strengthening: not yet started  Cardio program (V5): she is not  walking regularly- reminded her to use treadmill or walk outside and gave cardio information 5/19/22  Lifting education (V11):not yet started  Using Lumbar Roll:recommended    Education provided:   -reminder for cardio with handouts given  -neck roll for sleeping discussed and handout given    Written Home Exercises Provided: Patient instructed to cont prior HEP.  Exercises were reviewed and Beverley was able to demonstrate them prior to the end of the session.  Beverley demonstrated good  understanding of the education provided.     See EMR under Patient Instructions for exercises provided 4/19/22      Assessment   Beverley tolerated session well. Continued previously prescribed therex to reinforce HEP without issue.  Encouraged cardio and gave cardio handouts as well as handout for neck roll for sleeping.  Increased neck med x to 36-96 without difficulty.  Stressed the importance of attending therapy for therapeutic gain. MedX was performed at 84in lbs with 20 reps performed at an exertion rating of 3/10.       Patient is making good progress towards established goals.    Pt will continue to benefit from skilled outpatient physical therapy to address the deficits stated in the impairment chart, provide pt/family  education and to maximize pt's level of independence in the home and community environment.     Anticipated Barriers for therapy: nil  Pt's spiritual, cultural and educational needs considered and pt agreeable to plan of care and goals as stated below:     GOALS: Pt is in agreement with the following goals.     Short term goals: 6 weeks or 10 visits   1.  Pt will demonstratte increased cervical ROM as measured by med ex by 6 degrees from initial test which results in improved  ROM of neck for ease with ADLs and driving  (approp and ongoing)  2. Pt will demonstrate independence with reducing or controlling symptoms with ther ex, movement, or position independently, able to reduce pain 1-2 points on pain scale using strategies taught in therapy  (approp and ongoing)  3. Pt will demonstrate increased MedX average isometric strength value by 20% with  when compared to the initial testing resulting in improved ability to perform bending, lifting, and carrying activities safely, confidently.  (approp and ongoing)           Long term goals: 10 weeks or 20 visits  1. Pt will demonstratte increased cervical ROM as measured by med ex by 12 degrees from initial test which results in functional ROM of neck for ease with ADLs and driving  (approp and ongoing)  2. Pt will demonstrate increased MedX average isometric strength value  by 40% from initial test to improve ability to lift and carry, and sustain good posture while performing ADL's  (approp and ongoing)  3.Pt will demonstrate reduced pain and improved functional outcomes as reported on the FOTO by reaching a limitation score of < or = 30%% or less in order to demonstrate subjective improvement in pt's condition.    (approp and ongoing)  4. Pt will demonstrate independence with reducing or controlling symptoms with ther ex, movement, or position independently, able to reduce pain 2-4 points on pain scale using strategies taught in therapy  (approp and ongoing)  5. Pt will  demonstrate independence with the HEP at discharge.   (approp and ongoing)  6.  Pts goals: improve sleep, only be woken once by neck pain (currently wakes her 3-4/night at eval), reduce over all pain, be more physical   (approp and ongoing)      Plan   Continue with established Plan of Care towards established PT goals.     Makayla Ortega, PT  05/19/2022

## 2022-05-26 ENCOUNTER — PATIENT MESSAGE (OUTPATIENT)
Dept: REHABILITATION | Facility: HOSPITAL | Age: 57
End: 2022-05-26
Payer: COMMERCIAL

## 2022-05-31 ENCOUNTER — CLINICAL SUPPORT (OUTPATIENT)
Dept: REHABILITATION | Facility: HOSPITAL | Age: 57
End: 2022-05-31
Payer: COMMERCIAL

## 2022-05-31 DIAGNOSIS — M53.82 NECK MUSCLE WEAKNESS: ICD-10-CM

## 2022-05-31 DIAGNOSIS — R29.898 DECREASED RANGE OF MOTION OF NECK: Primary | ICD-10-CM

## 2022-05-31 PROCEDURE — 97110 THERAPEUTIC EXERCISES: CPT | Mod: PN | Performed by: PHYSICAL MEDICINE & REHABILITATION

## 2022-05-31 NOTE — PROGRESS NOTES
[facility] Back Physical Therapy Treatment      Name: Beverley Porter  Clinic Number: [id number]    Therapy Diagnosis:   Encounter Diagnoses   Name Primary?    Decreased range of motion of neck Yes    Neck muscle weakness      Physician: Raisa Rubin DO    Visit Date: 2022    Physician Orders: PT Eval and Treat    Medical Diagnosis from Referral:   M54.2 (ICD-10-CM) - Cervicalgia   M47.812 (ICD-10-CM) - Cervical spondylosis   Evaluation Date: 2022  Authorization Period Expiration: 3/17/23  Plan of Care Expiration: 22  Reassessment Due: 22  Visit # / Visits authorized:      Time In: 9:00  Time Out: 10:10 am  Total Billable Time: 59 minutes     Precautions:  Breast CA- bilat mastectomy with chemo, and abdominal and thigh tissue, not back        Pattern of pain determined: 1      Subjective   Beverley reports she is ok at rest, pain with movement, but not at rest. She did order a neck roll and feels it may help but feels she has consistent soreness in the morning and feels she may need a new pillow.   Suggested stretching before bed and in am, and watching posture before bed.  She is busy renovating her house and is active, but hasn't walked to walk.  This is still a goal.  She has a treadmill in the garage but reports walking outside or on the track might be a better goal.      Patient reports tolerating previous visit well.   Patient reports their pain to be 5/10 on a 0-10 scale  with 0 being no pain and 10 being the worst pain imaginable.  Pain Location:  neck     Occupation: retired from school system  Leisure: she has tried to exercise, she joined [employer]      Pts goals: improve sleep, only be woken once by neck pain (currently wakes her 3-4/night at eval), reduce over all pain, be more physical       Objective        Baseline Isometric Testing on Med X equipment:  Testing administered by PT  Date of testin22  ROM 36-90  deg 36-96 on 22   Max Peak Torque 109    Min Peak  "Torque 62    Flex/Ext Ratio 1.4/1   % below normative data -54%      Range of Motion - MOVEMENT LOSS     ROM Loss initial 5/31/22   Flexion within functional limits chin 2 fingers from chest, increases symptoms Within functional limits    Extension moderate loss 40 Min loss 50   Side bending Right major loss  12 Min loss 21   Side bending Left major loss 15 Min loss 24   Rotation Right major loss  45 Mod loss 55   Rotation Left major loss  47 Mod loss 62   Protraction minimal loss No loss   Retraction  major loss Mod loss          Outcomes:  Initial score:  47% limited  Visit 9 score: 47% limited  Goal:  < 30% limited      Treatment    Pt was instructed in and performed the following:     Beverley received therapeutic exercises to develop/improved posture, cardiovascular endurance, muscular endurance, cervical/thoracic ROM, strength and muscular endurance for 59 minutes including the following exercises:     Treadmill 6 min, focus on core    Thoracic ext over chair c/ foam roller, x10  Trap stretch, 2x30"  Chin tucks, 2x10  Chin tuck extensions off bed edge trial 3 reps  Neck retraction-ext c/ towel, x10  Standing open books, x10 ea  Side lie open books 10 each  Quadruped thoracic rotation in Emmanuel pose to isolate thoracic x 10  Yellow theraband scap retraction shoulder abd with hands behind back, palms forward    [facility] 5/31/2022   Visit Number 9   VAS Pain Rating 0   Treadmill Time (in min.) 6   Speed 2.7   Time -   Cervical Stretches - Retraction In Lying -   Retraction in Sitting 10   Retraction with Extension 10   Rotation -   Scapular Retraction 10   Manual Therapy -   Cervical Extension Seat Pad -   Seat Adjustment -   Top Dead Center -   Counterweight -   Cervical Flexion -   Cervical Extension -   Cervical Peak Torque -   Cervical Weight 93   Repetitions 15   Rating of Perceived Exertion 3   Ice - Z Lie (in min.) 5         Peripheral muscle strengthening which included 1 set of 15-20 repetitions at " "a slow, controlled 10-13 second per rep pace focused on strengthening supporting musculature for improved body mechanics and functional mobility.  Pt and therapist focused on proper form during treatment to ensure optimal strengthening of each targeted muscle group.  Machines were utilized including torso rotation, chest press, rowing, biceps, and triceps. Leg extension, leg curl, hip abd, hip add, and leg press added visit 3      Not performed 5/31/2022 :  Supine on bolster neck retractions 10, pelvic tilts 10, arms over head and stretching each arm 10 reps, alternating legs straight  Seated prayer stretch @ EOM 10x5" hold      Beverley received the following manual therapy techniques: Thoracic ext with therapist over pressure 10 reps  Seated, and supine neck side bend and rotation mobs      Home Exercises Provided and Patient Education Provided   Stretching: Stretching:  Neck retractions seated and supine with towel 3/day 10 reps  Neck retraction ext 10 reps   Thoracic extension seated  Supine lie pec stretch, arms over head and leg straight alternating for stretch  Quadruped thoracic rotation  scap retraction 3/day  Open books 2/day  Neck rotation with towel  retraction ext as tolerated, thoracic extension, over chair  Progress to thoracic mobility and scap strengthening  Also consider diaphragmatic breathing   Strengthening: not yet started  Cardio program (V5): she is not  walking regularly- reminded her to use treadmill or walk outside and gave cardio information 5/31/22  Lifting education (V11):not yet started  Using Lumbar Roll:recommended    Education provided:   -reminder for cardio with handouts given  -neck roll for sleeping discussed and handout given    Written Home Exercises Provided: Patient instructed to cont prior HEP.  Exercises were reviewed and Beverley was able to demonstrate them prior to the end of the session.  Beverley demonstrated good  understanding of the education provided.     See EMR under Patient " Instructions for exercises provided 4/19/22      Assessment   Beverley tolerated session well. Continued previously prescribed therex to reinforce HEP without issue.  Encouraged cardio and HEP.   Discussed stretching before bed and during the day.  foto scores unchanged.   Range of neck improved significantly with measuring and we will test on medx next visit.  medx  performed at 93 in lbs with 15 reps performed at an exertion rating of 3/10.       Patient is making good progress towards established goals.    Pt will continue to benefit from skilled outpatient physical therapy to address the deficits stated in the impairment chart, provide pt/family education and to maximize pt's level of independence in the home and community environment.     Anticipated Barriers for therapy: nil  Pt's spiritual, cultural and educational needs considered and pt agreeable to plan of care and goals as stated below:     GOALS: Pt is in agreement with the following goals.     Short term goals: 6 weeks or 10 visits   1.  Pt will demonstratte increased cervical ROM as measured by med ex by 6 degrees from initial test which results in improved  ROM of neck for ease with ADLs and driving  (approp and ongoing)  2. Pt will demonstrate independence with reducing or controlling symptoms with ther ex, movement, or position independently, able to reduce pain 1-2 points on pain scale using strategies taught in therapy  (approp and ongoing)  3. Pt will demonstrate increased MedX average isometric strength value by 20% with  when compared to the initial testing resulting in improved ability to perform bending, lifting, and carrying activities safely, confidently.  (approp and ongoing)           Long term goals: 10 weeks or 20 visits  1. Pt will demonstratte increased cervical ROM as measured by med ex by 12 degrees from initial test which results in functional ROM of neck for ease with ADLs and driving  (approp and ongoing)  2. Pt will demonstrate  increased MedX average isometric strength value  by 40% from initial test to improve ability to lift and carry, and sustain good posture while performing ADL's  (approp and ongoing)  3.Pt will demonstrate reduced pain and improved functional outcomes as reported on the FOTO by reaching a limitation score of < or = 30%% or less in order to demonstrate subjective improvement in pt's condition.    (approp and ongoing)  4. Pt will demonstrate independence with reducing or controlling symptoms with ther ex, movement, or position independently, able to reduce pain 2-4 points on pain scale using strategies taught in therapy  (approp and ongoing)  5. Pt will demonstrate independence with the HEP at discharge.   (approp and ongoing)  6.  Pts goals: improve sleep, only be woken once by neck pain (currently wakes her 3-4/night at eval), reduce over all pain, be more physical   (approp and ongoing)      Plan   Continue with established Plan of Care towards established PT goals.     Makayla Ortega, PT  05/31/2022

## 2022-06-01 NOTE — PROGRESS NOTES
[facility] Back Physical Therapy Treatment      Name: Beverley Porter  Clinic Number: [id number]    Therapy Diagnosis:   Encounter Diagnoses   Name Primary?    Decreased range of motion of neck Yes    Neck muscle weakness      Physician: Raisa Rubin DO    Visit Date: 6/2/2022    Physician Orders: PT Eval and Treat    Medical Diagnosis from Referral:   M54.2 (ICD-10-CM) - Cervicalgia   M47.812 (ICD-10-CM) - Cervical spondylosis   Evaluation Date: 4/5/2022  Authorization Period Expiration: 3/17/23  Plan of Care Expiration: 7/5/22  Reassessment Due: 06/30/22  Visit # / Visits authorized: 10/20     Time In: 10:30 am  Time Out: 11:40 am  Total Billable Time: 59 minutes     Precautions:  Breast CA- bilat mastectomy with chemo, and abdominal and thigh tissue, not back        Pattern of pain determined: 1      Subjective   Beverley reports she is ok at rest, pain with movement, but not at rest. She did order a neck roll and feels it may help but feels she has consistent soreness in the morning and feels she may need a new pillow.   Suggested stretching before bed and in am, and watching posture before bed.  She is busy renovating her house and is active, but hasn't walked to walk.  This is still a goal.  She has a treadmill in the garage but reports walking outside or on the track might be a better goal.  She reports she is moving better but she still has days where her neck hurts more than others    Patient reports tolerating previous visit well.   Patient reports their pain to be 5/10 with movement and 0/10 at rest on a 0-10 scale  with 0 being no pain and 10 being the worst pain imaginable.  Pain Location:  neck     Occupation: retired from school system  Leisure: she has tried to exercise, she joined [employer]      Pts goals: improve sleep, only be woken once by neck pain (currently wakes her 3-4/night at eval), reduce over all pain, be more physical       Objective        Baseline Isometric Testing on Med X  "equipment:  Testing administered by PT  Date of testin22  ROM 36-90  deg 36-96 on 22   Max Peak Torque 109    Min Peak Torque 62    Flex/Ext Ratio 1.4/1   % below normative data -54%      Midpoint  Isometric Testing on Med X equipment:  Testing administered by PT  Date of testin22  ROM 30-99   Max Peak Torque  190   Min Peak Torque 79   Flex/Ext Ratio 3/1   % below normative data -33% with 41% gain in strength         Range of Motion - MOVEMENT LOSS     ROM Loss initial 22   Flexion within functional limits chin 2 fingers from chest, increases symptoms Within functional limits    Extension moderate loss 40 Min loss 50   Side bending Right major loss  12 Min loss 21   Side bending Left major loss 15 Min loss 24   Rotation Right major loss  45 Mod loss 55   Rotation Left major loss  47 Mod loss 62   Protraction minimal loss No loss   Retraction  major loss Mod loss          Outcomes:  Initial score:  47% limited  Visit 9 score: 47% limited  Goal:  < 30% limited      Treatment    Pt was instructed in and performed the following:     Beverley received therapeutic exercises to develop/improved posture, cardiovascular endurance, muscular endurance, cervical/thoracic ROM, strength and muscular endurance for 59 minutes including the following exercises:     Treadmill 6 min, focus on core    Thoracic ext over chair c/ foam roller, x10  Trap stretch, 2x30"  Chin tucks, 2x10  Chin tuck extensions off bed edge trial 3 reps  Neck retraction-ext c/ towel, x10  Standing open books, x10 ea  Side lie open books 10 each  Quadruped thoracic rotation in Emmanuel pose to isolate thoracic x 10  Yellow theraband scap retraction shoulder abd with hands behind back, palms forward    [facility] 2022   Visit Number 10   VAS Pain Rating 0   Treadmill Time (in min.) 10   Speed 2.7   Time -   Cervical Stretches - Retraction In Lying -   Retraction in Sitting 10   Retraction with Extension 10   Rotation -   Scapular " "Retraction 10   Manual Therapy -   Cervical Extension Seat Pad -   Seat Adjustment -   Top Dead Center -   Counterweight -   Cervical Flexion 99   Cervical Extension 30   Cervical Peak Torque 190   Cervical Weight -   Repetitions -   Rating of Perceived Exertion -   Ice - Z Lie (in min.) 5         Peripheral muscle strengthening which included 1 set of 15-20 repetitions at a slow, controlled 10-13 second per rep pace focused on strengthening supporting musculature for improved body mechanics and functional mobility.  Pt and therapist focused on proper form during treatment to ensure optimal strengthening of each targeted muscle group.  Machines were utilized including torso rotation, chest press, rowing, biceps, and triceps. Leg extension, leg curl, hip abd, hip add, and leg press added visit 3      Not performed 6/2/2022 :  Supine on bolster neck retractions 10, pelvic tilts 10, arms over head and stretching each arm 10 reps, alternating legs straight  Seated prayer stretch @ EOM 10x5" hold      Beverley received the following manual therapy techniques: Thoracic ext with therapist over pressure 10 reps  Seated, and supine neck side bend and rotation mobs, and massage sub occipitals with hawk  tool      Home Exercises Provided and Patient Education Provided   Stretching: Stretching:  Neck retractions seated and supine with towel 3/day 10 reps  Neck retraction ext 10 reps   Thoracic extension seated  Supine lie pec stretch, arms over head and leg straight alternating for stretch  Quadruped thoracic rotation  scap retraction 3/day  Open books 2/day  Neck rotation with towel  retraction ext as tolerated, thoracic extension, over chair  Progress to thoracic mobility and scap strengthening  Also consider diaphragmatic breathing   Strengthening: not yet started  Cardio program (V5): she is not  walking regularly- reminded her to use treadmill or walk outside and gave cardio information 5/31/22  Lifting education (V11):not " yet started  Using Lumbar Roll:recommended    Education provided:   -reminder for cardio  -purpose of testing and testing results given    Written Home Exercises Provided: Patient instructed to cont prior HEP.  Exercises were reviewed and Beverley was able to demonstrate them prior to the end of the session.  Beverley demonstrated good  understanding of the education provided.     See EMR under Patient Instructions for exercises provided 4/19/22      Assessment   Beverley tolerated session well.   Patient has attended 10 visits at [facility] which included MD evaluation, PT evaluation with isometric testing, and physical therapy treatment including HEP instruction, education, aerobic work, dynamic strengthening on med ex equipment for the spine, and whole body strengthening on med ex equipment with increasing weight loads.  Patient  is demonstrating increased ability to reduce symptoms, improved posture, improved   ROM, and improved   strength as follows:    -Improved posture,   We recommended  using lumbar roll  -Improved cervical  ROM,  initially on med ex test 36-90 and   currently 30-99  -Improved strength at each test point on lumbar med ex IM test with   41 average improvement noted with Reduced pain  Noted by patient  -Initial outcome tool score 47% impaired  and current outcome tool score  47 % impaired -with reports of improved mobility        Patient is making good progress towards established goals.    Pt will continue to benefit from skilled outpatient physical therapy to address the deficits stated in the impairment chart, provide pt/family education and to maximize pt's level of independence in the home and community environment.     Anticipated Barriers for therapy: nil  Pt's spiritual, cultural and educational needs considered and pt agreeable to plan of care and goals as stated below:     GOALS: Pt is in agreement with the following goals.     Short term goals: 6 weeks or 10 visits   1.  Pt will  demonstratte increased cervical ROM as measured by med ex by 6 degrees from initial test which results in improved  ROM of neck for ease with ADLs and driving  (MET 6/2/22)  2. Pt will demonstrate independence with reducing or controlling symptoms with ther ex, movement, or position independently, able to reduce pain 1-2 points on pain scale using strategies taught in therapy   (MET 6/2/22)  3. Pt will demonstrate increased MedX average isometric strength value by 20% with  when compared to the initial testing resulting in improved ability to perform bending, lifting, and carrying activities safely, confidently.  (approp and ongoing)           Long term goals: 10 weeks or 20 visits  1. Pt will demonstratte increased cervical ROM as measured by med ex by 12 degrees from initial test which results in functional ROM of neck for ease with ADLs and driving   (MET 6/2/22)  2. Pt will demonstrate increased MedX average isometric strength value  by 40% from initial test to improve ability to lift and carry, and sustain good posture while performing ADL's    (MET 6/2/22)  3.Pt will demonstrate reduced pain and improved functional outcomes as reported on the FOTO by reaching a limitation score of < or = 30%% or less in order to demonstrate subjective improvement in pt's condition.    (approp and ongoing)  4. Pt will demonstrate independence with reducing or controlling symptoms with ther ex, movement, or position independently, able to reduce pain 2-4 points on pain scale using strategies taught in therapy  (approp and ongoing)  5. Pt will demonstrate independence with the HEP at discharge.   (approp and ongoing)  6.  Pts goals: improve sleep, only be woken once by neck pain (currently wakes her 3-4/night at eval), reduce over all pain, be more physical   (approp and ongoing)      Plan   Continue with established Plan of Care towards established PT goals.     Makayla Ortega, PT  06/02/2022

## 2022-06-02 ENCOUNTER — CLINICAL SUPPORT (OUTPATIENT)
Dept: REHABILITATION | Facility: HOSPITAL | Age: 57
End: 2022-06-02
Payer: COMMERCIAL

## 2022-06-02 DIAGNOSIS — R29.898 DECREASED RANGE OF MOTION OF NECK: Primary | ICD-10-CM

## 2022-06-02 DIAGNOSIS — M53.82 NECK MUSCLE WEAKNESS: ICD-10-CM

## 2022-06-02 PROCEDURE — 97110 THERAPEUTIC EXERCISES: CPT | Mod: PN | Performed by: PHYSICAL MEDICINE & REHABILITATION

## 2022-06-09 ENCOUNTER — CLINICAL SUPPORT (OUTPATIENT)
Dept: REHABILITATION | Facility: HOSPITAL | Age: 57
End: 2022-06-09
Payer: COMMERCIAL

## 2022-06-09 DIAGNOSIS — R29.898 DECREASED RANGE OF MOTION OF NECK: Primary | ICD-10-CM

## 2022-06-09 DIAGNOSIS — M53.82 NECK MUSCLE WEAKNESS: ICD-10-CM

## 2022-06-09 PROCEDURE — 97110 THERAPEUTIC EXERCISES: CPT | Mod: PN | Performed by: PHYSICAL MEDICINE & REHABILITATION

## 2022-06-09 NOTE — PROGRESS NOTES
[facility] Back Physical Therapy Treatment      Name: Beverley Porter  Clinic Number: [id number]    Therapy Diagnosis:   Encounter Diagnoses   Name Primary?    Decreased range of motion of neck Yes    Neck muscle weakness      Physician: Raisa Rubin DO    Visit Date: 6/9/2022    Physician Orders: PT Eval and Treat    Medical Diagnosis from Referral:   M54.2 (ICD-10-CM) - Cervicalgia   M47.812 (ICD-10-CM) - Cervical spondylosis   Evaluation Date: 4/5/2022  Authorization Period Expiration: 3/17/23  Plan of Care Expiration: 7/5/22  Reassessment Due: 06/30/22  Visit # / Visits authorized: 11/20     Time In: 10:30 am  Time Out: 11:25 am  Total Billable Time: 55 minutes     Precautions:  Breast CA- bilat mastectomy with chemo, and abdominal and thigh tissue, not back        Pattern of pain determined: 1      Subjective   Beverley reports she is ok at rest, pain with movement, but not at rest. She did order a neck roll and feels it may help but feels she has consistent soreness in the morning and feels she may need a new pillow.   Suggested stretching before bed and in am, and doing neck isometrics before bed.  She noticed she felt really good after her last visit here for 2 days, and she feels better when she lies down and pushes her head into the pillow.     She is busy renovating her house and is active, but hasn't walked to walk.  This is still a goal.  She has a treadmill in the garage but reports walking outside or on the track might be a better goal.  She reports she is moving better but she still has days where her neck hurts more than others    Patient reports tolerating previous visit well.   Patient reports their pain to be 4/10 with movement and 0/10 at rest on a 0-10 scale  with 0 being no pain and 10 being the worst pain imaginable.  Pain Location:  neck     Occupation: retired from school system  Leisure: she has tried to exercise, she joined [employer]      Pts goals: improve sleep, only be woken once  "by neck pain (currently wakes her 3-4/night at eval), reduce over all pain, be more physical       Objective        Baseline Isometric Testing on Med X equipment:  Testing administered by PT  Date of testin22  ROM 36-90  deg 36-96 on 22   Max Peak Torque 109    Min Peak Torque 62    Flex/Ext Ratio 1.4/1   % below normative data -54%      Midpoint  Isometric Testing on Med X equipment:  Testing administered by PT  Date of testin22  ROM 30-99   Max Peak Torque  190   Min Peak Torque 79   Flex/Ext Ratio 3/1   % below normative data -33% with 41% gain in strength         Range of Motion - MOVEMENT LOSS     ROM Loss initial 22   Flexion within functional limits chin 2 fingers from chest, increases symptoms Within functional limits    Extension moderate loss 40 Min loss 50   Side bending Right major loss  12 Min loss 21   Side bending Left major loss 15 Min loss 24   Rotation Right major loss  45 Mod loss 55   Rotation Left major loss  47 Mod loss 62   Protraction minimal loss No loss   Retraction  major loss Mod loss          Outcomes:  Initial score:  47% limited  Visit 9 score: 47% limited  Goal:  < 30% limited      Treatment    Pt was instructed in and performed the following:     Beverley received therapeutic exercises to develop/improved posture, cardiovascular endurance, muscular endurance, cervical/thoracic ROM, strength and muscular endurance for 59 minutes including the following exercises:     Treadmill 6 min, focus on core    Thoracic ext over chair c/ foam roller, x10  Trap stretch, 2x30"  Chin tucks, 2x10  Chin tuck extensions off bed edge trial 3 reps  Neck retraction-ext c/ towel, x10  Standing open books, x10 ea    Lifting education performed and given  Floor to waist lifts 20 lbs  Hip hinge lifts with back pole along spine for biofeedback 10 reps  Golfer's lift with 2 lbs      [facility] 2022   Visit Number 11   VAS Pain Rating 2   Treadmill Time (in min.) 10   Speed 1.8 " "  Time -   Cervical Stretches - Retraction In Lying -   Retraction in Sitting 10   Retraction with Extension 10   Rotation -   Scapular Retraction 10   Manual Therapy -   Cervical Extension Seat Pad -   Seat Adjustment -   Top Dead Center -   Counterweight -   Cervical Flexion -   Cervical Extension -   Cervical Peak Torque -   Cervical Weight 93   Repetitions 20   Rating of Perceived Exertion 4   Ice - Z Lie (in min.) -         Peripheral muscle strengthening which included 1 set of 15-20 repetitions at a slow, controlled 10-13 second per rep pace focused on strengthening supporting musculature for improved body mechanics and functional mobility.  Pt and therapist focused on proper form during treatment to ensure optimal strengthening of each targeted muscle group.  Machines were utilized including torso rotation, chest press, rowing, biceps, and triceps. Leg extension, leg curl, hip abd, hip add, and leg press added visit 3      Not performed 6/9/2022 :  Supine on bolster neck retractions 10, pelvic tilts 10, arms over head and stretching each arm 10 reps, alternating legs straight  Seated prayer stretch @ EOM 10x5" hold  Side lie open books 10 each  Quadruped thoracic rotation in Emmanuel pose to isolate thoracic x 10  Yellow theraband scap retraction shoulder abd with hands behind back, palms forward        Beverley received the following manual therapy techniques: Thoracic ext with therapist over pressure 10 reps  Seated, and supine neck side bend and rotation mobs, and massage sub occipitals with hawk  tool      Home Exercises Provided and Patient Education Provided   Stretching: Stretching:  Neck retractions seated and supine with towel 3/day 10 reps  Neck retraction ext 10 reps   Thoracic extension seated  Supine lie pec stretch, arms over head and leg straight alternating for stretch  Quadruped thoracic rotation  scap retraction 3/day  Open books 2/day  Neck rotation with towel  retraction ext as tolerated, " thoracic extension, over chair  Progress to thoracic mobility and scap strengthening  Also consider diaphragmatic breathing   Strengthening: not yet started  Cardio program (V5): she is not  walking regularly- reminded her to use treadmill or walk outside and gave cardio information 5/31/22  Lifting education (V11): done 6/9/22  Using Lumbar Roll:recommended    Education provided:   -reminder for cardio  -lifting education performed, handouts given    Written Home Exercises Provided: Patient instructed to cont prior HEP.  Exercises were reviewed and Beverley was able to demonstrate them prior to the end of the session.  Beverely demonstrated good  understanding of the education provided.     See EMR under Patient Instructions for exercises provided 6/9/22      Assessment   Beverley tolerated session well. She has intermittent symptoms that still come and go.  She is trying to do her exercises and does note she feels better with isometric head pushes into pillow and retractions.   We went over lifting education today which she understood. She will continue to benefit from therapy to address range issues, and symptom management.        Patient is making good progress towards established goals.    Pt will continue to benefit from skilled outpatient physical therapy to address the deficits stated in the impairment chart, provide pt/family education and to maximize pt's level of independence in the home and community environment.     Anticipated Barriers for therapy: nil  Pt's spiritual, cultural and educational needs considered and pt agreeable to plan of care and goals as stated below:     GOALS: Pt is in agreement with the following goals.     Short term goals: 6 weeks or 10 visits   1.  Pt will demonstratte increased cervical ROM as measured by med ex by 6 degrees from initial test which results in improved  ROM of neck for ease with ADLs and driving  (MET 6/2/22)  2. Pt will demonstrate independence with reducing or controlling  symptoms with ther ex, movement, or position independently, able to reduce pain 1-2 points on pain scale using strategies taught in therapy   (MET 6/2/22)  3. Pt will demonstrate increased MedX average isometric strength value by 20% with  when compared to the initial testing resulting in improved ability to perform bending, lifting, and carrying activities safely, confidently.  (approp and ongoing)           Long term goals: 10 weeks or 20 visits  1. Pt will demonstratte increased cervical ROM as measured by med ex by 12 degrees from initial test which results in functional ROM of neck for ease with ADLs and driving   (MET 6/2/22)  2. Pt will demonstrate increased MedX average isometric strength value  by 40% from initial test to improve ability to lift and carry, and sustain good posture while performing ADL's    (MET 6/2/22)  3.Pt will demonstrate reduced pain and improved functional outcomes as reported on the FOTO by reaching a limitation score of < or = 30%% or less in order to demonstrate subjective improvement in pt's condition.    (approp and ongoing)  4. Pt will demonstrate independence with reducing or controlling symptoms with ther ex, movement, or position independently, able to reduce pain 2-4 points on pain scale using strategies taught in therapy  (approp and ongoing)  5. Pt will demonstrate independence with the HEP at discharge.   (approp and ongoing)  6.  Pts goals: improve sleep, only be woken once by neck pain (currently wakes her 3-4/night at eval), reduce over all pain, be more physical   (approp and ongoing)      Plan   Continue with established Plan of Care towards established PT goals.     Makayla Ortega, PT  06/09/2022

## 2022-06-27 ENCOUNTER — OFFICE VISIT (OUTPATIENT)
Dept: PSYCHIATRY | Facility: CLINIC | Age: 57
End: 2022-06-27
Payer: COMMERCIAL

## 2022-06-27 DIAGNOSIS — F33.0 MILD EPISODE OF RECURRENT MAJOR DEPRESSIVE DISORDER: Primary | ICD-10-CM

## 2022-06-27 PROCEDURE — 4010F ACE/ARB THERAPY RXD/TAKEN: CPT | Mod: CPTII,95,, | Performed by: SOCIAL WORKER

## 2022-06-27 PROCEDURE — 4010F PR ACE/ARB THEARPY RXD/TAKEN: ICD-10-PCS | Mod: CPTII,95,, | Performed by: SOCIAL WORKER

## 2022-06-27 PROCEDURE — 90837 PR PSYCHOTHERAPY W/PATIENT, 60 MIN: ICD-10-PCS | Mod: 95,,, | Performed by: SOCIAL WORKER

## 2022-06-27 PROCEDURE — 90837 PSYTX W PT 60 MINUTES: CPT | Mod: 95,,, | Performed by: SOCIAL WORKER

## 2022-06-27 NOTE — PROGRESS NOTES
"Telemedicine Visit  The patient location is: [place]  The chief complaint leading to consultation is: depression    Visit type: audiovisual    Face to Face time with patient: 55  60 minutes of total time spent on the encounter, which includes face to face time and non-face to face time preparing to see the patient (eg, review of tests), Obtaining and/or reviewing separately obtained history, Documenting clinical information in the electronic or other health record, Independently interpreting results (not separately reported) and communicating results to the patient/family/caregiver, or Care coordination (not separately reported).     Each patient to whom he or she provides medical services by telemedicine is:  (1) informed of the relationship between the physician and patient and the respective role of any other health care provider with respect to management of the patient; and (2) notified that he or she may decline to receive medical services by telemedicine and may withdraw from such care at any time.    Notes:     [facility]  Individual Psychotherapy   Beverley YEISON Porter,  6/27/2022    Site: [facility] - Dept of Psychiatry    Therapeutic Intervention: Met with patient for individual psychotherapy follow up.    Chief complaint/reason for encounter: depression, anxiety and interpersonal     Content of current session: Met with Beverley for follow up session.   Ongoing problems with drinking - feeling shame then that conflicts with justification.   says she's mean when she drinks - wishes she had episodes on video so she could see if she's really mean. Discussed whether that would change Beverley's behavior in any way.  Pt states that she is unsure if Cymbalta is actually helping with pain, but if she stops taking it, after about a week she "gets emotional" - doesn't actually notice pain more or less (always has pain due to neck issues).   Encouraged pt to consider seeing psychiatrist - for more accurate " prescribing of antidepressant.  Acknowledges lack of motivation to do much of anything. Asked pt to start putting one thing on her calendar each day and lets see if that helps her to take some action.  Looking into taking pickle-ball lessons.  Pt making very little progress/making few changes.  Drinking remains a significant problem but pt in pre-contemplation stage.    FU in 1 month    From Previous Session:  Beverley shares that she has been drinking some.  Focus of session was Beverley's concern about son Kwadwo (25).  His part-time coaching job has ended, doing nothing, sleeps all day, smokes a lot of MJ.  Beverley very tearful and openly vulnerable regarding Kwadwo.  She will not enforce rules or set expectations and this causes further tension between Beverley and Celso.  Proposed to Beverley that Kwadwo sounds very depressed and encouraged pt to try to get him to see his primary care provider (in hopes of a referral to ).      HW:  Beverley will choose one or 2 values to focus one each day and take intentional action toward strengthening those values.    FU in 2-3 weeks.        Treatment plan:  · Target symptoms: depression  · Why chosen therapy is appropriate versus another modality: patient responds to this modality  · Outcome monitoring methods: self-report, observation  · Therapeutic intervention type: insight oriented psychotherapy, supportive psychotherapy    Risk parameters:  Patient reports no suicidal ideation  Patient reports no homicidal ideation  Patient reports no self-injurious behavior  Patient reports no violent behavior      Patient's response to intervention:  The patient's response to intervention is accepting.    Progress toward goals and other mental status changes:  The patient's progress toward goals is poor.    Diagnosis:     ICD-10-CM ICD-9-CM   1. Mild episode of recurrent major depressive disorder  F33.0 296.31       Plan: Pt plans to continue individual psychotherapy    Return to clinic:  1 month    Length of  Service (minutes): 60

## 2022-08-22 ENCOUNTER — OFFICE VISIT (OUTPATIENT)
Dept: PSYCHIATRY | Facility: CLINIC | Age: 57
End: 2022-08-22
Payer: COMMERCIAL

## 2022-08-22 DIAGNOSIS — F41.9 ANXIETY DISORDER, UNSPECIFIED TYPE: ICD-10-CM

## 2022-08-22 DIAGNOSIS — F33.0 MILD EPISODE OF RECURRENT MAJOR DEPRESSIVE DISORDER: Primary | ICD-10-CM

## 2022-08-22 PROCEDURE — 99999 PR PBB SHADOW E&M-EST. PATIENT-LVL I: ICD-10-PCS | Mod: PBBFAC,,, | Performed by: SOCIAL WORKER

## 2022-08-22 PROCEDURE — 99999 PR PBB SHADOW E&M-EST. PATIENT-LVL I: CPT | Mod: PBBFAC,,, | Performed by: SOCIAL WORKER

## 2022-08-22 PROCEDURE — 4010F ACE/ARB THERAPY RXD/TAKEN: CPT | Mod: CPTII,S$GLB,, | Performed by: SOCIAL WORKER

## 2022-08-22 PROCEDURE — 4010F PR ACE/ARB THEARPY RXD/TAKEN: ICD-10-PCS | Mod: CPTII,S$GLB,, | Performed by: SOCIAL WORKER

## 2022-08-22 PROCEDURE — 90837 PR PSYCHOTHERAPY W/PATIENT, 60 MIN: ICD-10-PCS | Mod: S$GLB,,, | Performed by: SOCIAL WORKER

## 2022-08-22 PROCEDURE — 90837 PSYTX W PT 60 MINUTES: CPT | Mod: S$GLB,,, | Performed by: SOCIAL WORKER

## 2022-08-22 NOTE — PROGRESS NOTES
"  [facility]ecilia Arciniega, [facility]  Individual Psychotherapy   Beverley Porter,  8/22/2022    Site: [facility] - Dept of Psychiatry    Therapeutic Intervention: Met with patient for individual psychotherapy follow up.    Chief complaint/reason for encounter: depression, anxiety and interpersonal     Content of current session: Met with Beverley for follow up session.   8- Beverley shares that little has changed since we last met two months ago.  Younger son Aj has completed education in [employer] (goal is to become a ) and moved back into the family home.  Renovation continues, slowly.  Beverley states that she continues to drink and that continues to be a problem between Beverley and  Braulio.  Explored reasons things are no different.  Primary concern continues to be son Kwadwo (25) not working, smokes MJ (has medical card), source of frustration.  Beverley resents that Braulio won't "do anything" to support her, reasons that he resents her for never enforcing expectations on Kwadwo.  Processed some feelings associated with resistance to communicating with  about need for support (each needs the others' support).  Beverley proposed (perhaps off the cuff) putting Kwadwo out of the house 8-5 Monday - Friday.    PHQ9 = 9 Mild  GAD7 = 11 Moderate      FU as scheduled        From Previous Session:  6- Ongoing problems with drinking - feeling shame then that conflicts with justification.   says she's mean when she drinks - wishes she had episodes on video so she could see if she's really mean. Discussed whether that would change Beverley's behavior in any way.  Pt states that she is unsure if Cymbalta is actually helping with pain, but if she stops taking it, after about a week she "gets emotional" - doesn't actually notice pain more or less (always has pain due to neck issues).   Encouraged pt to consider seeing psychiatrist - for more accurate prescribing of antidepressant.  Acknowledges lack of motivation to do " much of anything. Asked pt to start putting one thing on her calendar each day and lets see if that helps her to take some action.  Looking into taking pickle-ball lessons.  Pt making very little progress/making few changes.  Drinking remains a significant problem but pt in pre-contemplation stage.    FU in 1 month          Treatment plan:  · Target symptoms: depression  · Why chosen therapy is appropriate versus another modality: patient responds to this modality  · Outcome monitoring methods: self-report, observation  · Therapeutic intervention type: insight oriented psychotherapy, supportive psychotherapy    Risk parameters:  Patient reports no suicidal ideation  Patient reports no homicidal ideation  Patient reports no self-injurious behavior  Patient reports no violent behavior      Patient's response to intervention:  The patient's response to intervention is accepting.    Progress toward goals and other mental status changes:  The patient's progress toward goals is poor.    Diagnosis:     ICD-10-CM ICD-9-CM   1. Mild episode of recurrent major depressive disorder  F33.0 296.31   2. Anxiety disorder, unspecified type  F41.9 300.00       Plan: Pt plans to continue individual psychotherapy    Return to clinic:  As scheduled    Length of Service (minutes): 60

## 2022-08-29 ENCOUNTER — PATIENT MESSAGE (OUTPATIENT)
Dept: PSYCHIATRY | Facility: CLINIC | Age: 57
End: 2022-08-29
Payer: COMMERCIAL

## 2022-08-30 ENCOUNTER — OFFICE VISIT (OUTPATIENT)
Dept: PSYCHIATRY | Facility: CLINIC | Age: 57
End: 2022-08-30
Payer: COMMERCIAL

## 2022-08-30 DIAGNOSIS — F41.9 ANXIETY DISORDER, UNSPECIFIED TYPE: Primary | ICD-10-CM

## 2022-08-30 PROCEDURE — 4010F ACE/ARB THERAPY RXD/TAKEN: CPT | Mod: CPTII,95,, | Performed by: SOCIAL WORKER

## 2022-08-30 PROCEDURE — 90832 PSYTX W PT 30 MINUTES: CPT | Mod: 95,,, | Performed by: SOCIAL WORKER

## 2022-08-30 PROCEDURE — 4010F PR ACE/ARB THEARPY RXD/TAKEN: ICD-10-PCS | Mod: CPTII,95,, | Performed by: SOCIAL WORKER

## 2022-08-30 PROCEDURE — 90832 PR PSYCHOTHERAPY W/PATIENT, 30 MIN: ICD-10-PCS | Mod: 95,,, | Performed by: SOCIAL WORKER

## 2022-08-30 NOTE — PROGRESS NOTES
Telemedicine Visit  The patient location is: [place]  The chief complaint leading to consultation is: anxiety    Visit type: audiovisual    Face to Face time with patient: 30  35 minutes of total time spent on the encounter, which includes face to face time and non-face to face time preparing to see the patient (eg, review of tests), Obtaining and/or reviewing separately obtained history, Documenting clinical information in the electronic or other health record, Independently interpreting results (not separately reported) and communicating results to the patient/family/caregiver, or Care coordination (not separately reported).     Each patient to whom he or she provides medical services by telemedicine is:  (1) informed of the relationship between the physician and patient and the respective role of any other health care provider with respect to management of the patient; and (2) notified that he or she may decline to receive medical services by telemedicine and may withdraw from such care at any time.    Notes:    [facility]iega [facility]  Individual Psychotherapy   Beverley Porter,  8/30/2022    Site: [facility] - Dept of Psychiatry    Therapeutic Intervention: Met with patient for individual psychotherapy follow up.    Chief complaint/reason for encounter: depression, anxiety and interpersonal     Content of current session: Met with Beverley for follow up session.   8- Beverley talked about having a really bad day on Sunday.  She states that she got so frustrated with Kwadwo and his resistance to doing anything that she drank more than she even usually drinks.  Focus of this session was much of the same how frustrated she is with her son's laziness and lack of motivation.  Therapist provided education regarding the stages of change model and discussed what it means to be in pre contemplation.  Contrasted where an is, she wants to be in action, verses where Kwadwo is.  We discussed the difference and the power  "differential.  And had a meeting and we had to cut this session at 30 minutes.  Left her to contemplate where will change start and what will happen if she does not initiate change.    Follow-up as scheduled    From Previous Session:  8- Beverley shares that little has changed since we last met two months ago.  Younger son Aj has completed education in [employer] (goal is to become a ) and moved back into the family home.  Renovation continues, slowly.  Beverley states that she continues to drink and that continues to be a problem between Beverley and  Braulio.  Explored reasons things are no different.  Primary concern continues to be son Kwadwo (25) not working, smokes MJ (has medical card), source of frustration.  Beverley resents that Braulio won't "do anything" to support her, reasons that he resents her for never enforcing expectations on Kwadwo.  Processed some feelings associated with resistance to communicating with  about need for support (each needs the others' support).  Beverley proposed (perhaps off the cuff) putting Kwadwo out of the house 8-5 Monday - Friday.    PHQ9 = 9 Mild  GAD7 = 11 Moderate      FU as scheduled          Treatment plan:  Target symptoms: depression  Why chosen therapy is appropriate versus another modality: patient responds to this modality  Outcome monitoring methods: self-report, observation  Therapeutic intervention type: insight oriented psychotherapy, supportive psychotherapy    Risk parameters:  Patient reports no suicidal ideation  Patient reports no homicidal ideation  Patient reports no self-injurious behavior  Patient reports no violent behavior      Patient's response to intervention:  The patient's response to intervention is accepting.    Progress toward goals and other mental status changes:  The patient's progress toward goals is poor.    Diagnosis:     ICD-10-CM ICD-9-CM   1. Anxiety disorder, unspecified type  F41.9 300.00         Plan: Pt plans to " continue individual psychotherapy    Return to clinic:  As scheduled    Length of Service (minutes): 30

## 2022-10-31 ENCOUNTER — TELEPHONE (OUTPATIENT)
Dept: SPORTS MEDICINE | Facility: CLINIC | Age: 57
End: 2022-10-31
Payer: COMMERCIAL

## 2022-10-31 NOTE — TELEPHONE ENCOUNTER
Spoke to the Pt about her request for a follow up appt for her neck pain.  Informed her Dr Kline is out and that we could schedule for Dr Owen Rubin.  She stated ok and we found a date and time that would work for her .  She confirmed the details.

## 2022-10-31 NOTE — TELEPHONE ENCOUNTER
----- Message from Dmitry Darnell sent at 10/31/2022  3:20 PM CDT -----  Regarding: APPOINTMENT  Contact: Self  Pt stated she is having a lot of pain in her neck and head, request to be seen as soon as possible      Contact info  [phone] (home)

## 2022-11-01 ENCOUNTER — DOCUMENTATION ONLY (OUTPATIENT)
Dept: REHABILITATION | Facility: HOSPITAL | Age: 57
End: 2022-11-01
Payer: COMMERCIAL

## 2022-11-01 PROBLEM — M53.82 NECK MUSCLE WEAKNESS: Status: RESOLVED | Noted: 2022-04-05 | Resolved: 2022-11-01

## 2022-11-01 PROBLEM — R29.898 DECREASED RANGE OF MOTION OF NECK: Status: RESOLVED | Noted: 2022-04-05 | Resolved: 2022-11-01

## 2022-11-01 NOTE — PROGRESS NOTES
Outpatient Physical Therapy Discharge Summary    Date: 11/01/2022      Date of PT eval: 4/5/22  Number of PT visits: 11  Date of last visit: 6/9/22    Assessment:  Unable to assess if goals met secondary to lack of attendance.     Short term goals met: no  Long term goals met: no    Plan: Discharge from outpatient physical therapy due to noncompliance    Dash Portillo, PT, DPT   11/1/2022

## 2022-11-02 ENCOUNTER — OFFICE VISIT (OUTPATIENT)
Dept: SPORTS MEDICINE | Facility: CLINIC | Age: 57
End: 2022-11-02
Payer: COMMERCIAL

## 2022-11-02 VITALS
WEIGHT: 149 LBS | SYSTOLIC BLOOD PRESSURE: 125 MMHG | BODY MASS INDEX: 29.25 KG/M2 | DIASTOLIC BLOOD PRESSURE: 82 MMHG | HEIGHT: 60 IN

## 2022-11-02 DIAGNOSIS — M99.08 SOMATIC DYSFUNCTION OF RIB CAGE REGION: ICD-10-CM

## 2022-11-02 DIAGNOSIS — M99.02 SOMATIC DYSFUNCTION OF THORACIC REGION: ICD-10-CM

## 2022-11-02 DIAGNOSIS — M99.07 SOMATIC DYSFUNCTION OF UPPER EXTREMITY: ICD-10-CM

## 2022-11-02 DIAGNOSIS — M99.00 CRANIAL SOMATIC DYSFUNCTION: ICD-10-CM

## 2022-11-02 DIAGNOSIS — M47.812 CERVICAL SPONDYLOSIS: ICD-10-CM

## 2022-11-02 DIAGNOSIS — M99.01 SOMATIC DYSFUNCTION OF CERVICAL REGION: ICD-10-CM

## 2022-11-02 DIAGNOSIS — M54.2 CERVICALGIA: Primary | ICD-10-CM

## 2022-11-02 DIAGNOSIS — G44.86 CERVICOGENIC HEADACHE: ICD-10-CM

## 2022-11-02 PROCEDURE — 98927 PR OSTEOPATHIC MANIP,5-6 BODY REGN: ICD-10-PCS | Mod: S$GLB,,, | Performed by: ORTHOPAEDIC SURGERY

## 2022-11-02 PROCEDURE — 1159F PR MEDICATION LIST DOCUMENTED IN MEDICAL RECORD: ICD-10-PCS | Mod: CPTII,S$GLB,, | Performed by: ORTHOPAEDIC SURGERY

## 2022-11-02 PROCEDURE — 1159F MED LIST DOCD IN RCRD: CPT | Mod: CPTII,S$GLB,, | Performed by: ORTHOPAEDIC SURGERY

## 2022-11-02 PROCEDURE — 4010F PR ACE/ARB THEARPY RXD/TAKEN: ICD-10-PCS | Mod: CPTII,S$GLB,, | Performed by: ORTHOPAEDIC SURGERY

## 2022-11-02 PROCEDURE — 1160F RVW MEDS BY RX/DR IN RCRD: CPT | Mod: CPTII,S$GLB,, | Performed by: ORTHOPAEDIC SURGERY

## 2022-11-02 PROCEDURE — 3079F PR MOST RECENT DIASTOLIC BLOOD PRESSURE 80-89 MM HG: ICD-10-PCS | Mod: CPTII,S$GLB,, | Performed by: ORTHOPAEDIC SURGERY

## 2022-11-02 PROCEDURE — 3008F PR BODY MASS INDEX (BMI) DOCUMENTED: ICD-10-PCS | Mod: CPTII,S$GLB,, | Performed by: ORTHOPAEDIC SURGERY

## 2022-11-02 PROCEDURE — 98927 OSTEOPATH MANJ 5-6 REGIONS: CPT | Mod: S$GLB,,, | Performed by: ORTHOPAEDIC SURGERY

## 2022-11-02 PROCEDURE — 4010F ACE/ARB THERAPY RXD/TAKEN: CPT | Mod: CPTII,S$GLB,, | Performed by: ORTHOPAEDIC SURGERY

## 2022-11-02 PROCEDURE — 99999 PR PBB SHADOW E&M-EST. PATIENT-LVL III: CPT | Mod: PBBFAC,,, | Performed by: ORTHOPAEDIC SURGERY

## 2022-11-02 PROCEDURE — 99214 PR OFFICE/OUTPT VISIT, EST, LEVL IV, 30-39 MIN: ICD-10-PCS | Mod: 25,S$GLB,, | Performed by: ORTHOPAEDIC SURGERY

## 2022-11-02 PROCEDURE — 99214 OFFICE O/P EST MOD 30 MIN: CPT | Mod: 25,S$GLB,, | Performed by: ORTHOPAEDIC SURGERY

## 2022-11-02 PROCEDURE — 3074F SYST BP LT 130 MM HG: CPT | Mod: CPTII,S$GLB,, | Performed by: ORTHOPAEDIC SURGERY

## 2022-11-02 PROCEDURE — 1160F PR REVIEW ALL MEDS BY PRESCRIBER/CLIN PHARMACIST DOCUMENTED: ICD-10-PCS | Mod: CPTII,S$GLB,, | Performed by: ORTHOPAEDIC SURGERY

## 2022-11-02 PROCEDURE — 99999 PR PBB SHADOW E&M-EST. PATIENT-LVL III: ICD-10-PCS | Mod: PBBFAC,,, | Performed by: ORTHOPAEDIC SURGERY

## 2022-11-02 PROCEDURE — 3079F DIAST BP 80-89 MM HG: CPT | Mod: CPTII,S$GLB,, | Performed by: ORTHOPAEDIC SURGERY

## 2022-11-02 PROCEDURE — 3008F BODY MASS INDEX DOCD: CPT | Mod: CPTII,S$GLB,, | Performed by: ORTHOPAEDIC SURGERY

## 2022-11-02 PROCEDURE — 3074F PR MOST RECENT SYSTOLIC BLOOD PRESSURE < 130 MM HG: ICD-10-PCS | Mod: CPTII,S$GLB,, | Performed by: ORTHOPAEDIC SURGERY

## 2022-11-02 NOTE — PROGRESS NOTES
CC: neck pain     57 y.o. Female presents today for evaluation of her neck pain. She admits to neck pain for the past several years that has progressively worsened. She has been evaluated for this problem by Dr. Raisa Rubin and had OMT, following which she appreciated several weeks of improvement in her pain following. When asked where she hurts she gestures to the trapezius muscle bilaterally and sweeps her hands to the occiput bilaterally.   How long:  Several years  What makes it better: Rest, OMT, stretching and physical therapy  What makes it worse: Laying down, trying to sleep, turning her head  Does it radiate: Denies  Attempted treatments: She has previously attended physical therapy for this problem and had OMT which has been somewhat helpful. She denies currently taking any medications for this problem and denies history of neck injection/surgery.   Pain score: 5/10  Any mechanical symptoms: Admits to occasionally appreciating a popping sensation.   Feelings of instability: Denies  Affecting ADLs: Admits to this problem affecting her ability to perform ADLs as it is negatively impacting her ability to sleep.       PAST MEDICAL HISTORY:   Past Medical History:   Diagnosis Date    Breast cancer     Genital herpes, unspecified     Hypertension        PAST SURGICAL HISTORY:   Past Surgical History:   Procedure Laterality Date    BREAST LUMPECTOMY      DILATION AND CURETTAGE OF UTERUS      ENDOMETRIAL ABLATION      FOOT SURGERY         FAMILY HISTORY:   Family History   Problem Relation Age of Onset    Breast cancer Paternal Grandmother     Colon cancer Neg Hx     Ovarian cancer Neg Hx        SOCIAL HISTORY:   Social History     Socioeconomic History    Marital status:    Tobacco Use    Smoking status: Never    Smokeless tobacco: Never   Substance and Sexual Activity    Alcohol use: No       MEDICATIONS:     Current Outpatient Medications:     CALCIUM ORAL, Take by mouth., Disp: , Rfl:     DULoxetine  (CYMBALTA) 20 MG capsule, Take 20 mg by mouth once daily., Disp: , Rfl:     DULoxetine (CYMBALTA) 30 MG capsule, TK 1 C PO D, Disp: , Rfl: 3    ergocalciferol, vitamin D2, (VITAMIN D ORAL), Take by mouth., Disp: , Rfl:     letrozole (FEMARA) 2.5 mg Tab, Take 2.5 mg by mouth once daily., Disp: , Rfl:     meloxicam (MOBIC) 7.5 MG tablet, Take 1 tablet (7.5 mg total) by mouth once daily. Take with food, Disp: 30 tablet, Rfl: 2    valACYclovir (VALTREX) 500 MG tablet, TAKE 1 TABLET(500 MG) BY MOUTH EVERY DAY, Disp: 30 tablet, Rfl: 11    ALLERGIES:   Review of patient's allergies indicates:  No Known Allergies     PHYSICAL EXAMINATION:  /82   Ht 5' (1.524 m)   Wt 67.6 kg (149 lb)   BMI 29.10 kg/m²   Vitals signs and nursing note have been reviewed.  General: In no acute distress, well developed, well nourished, no diaphoresis  Eyes: EOM full and smooth, no eye redness or discharge  HENT: normocephalic and atraumatic, neck supple, trachea midline, no nasal discharge, no external ear redness or discharge  Cardiovascular: 2+ and symmetric radial bilaterally, no LE edema  Lungs: respirations non-labored, no conversational dyspnea   Abd: non-distended, no rigidity  MSK: no amputation or deformity, no swelling of extremities  Neuro: AAOx3, CN2-12 grossly intact  Skin: No rashes, warm and dry  Psychiatric: cooperative, pleasant, mood and affect appropriate for age    MUSCULOSKELETAL    CERVICAL SPINE    INSPECTION  Anterior head carriage.    Normal cervicothoracolumbar curves.    No obvious pelvic obliquity while standing.    No edema, erythema, or ecchymosis noted in cervical spine or shoulder girdle regions.    No atrophy noted in the upper limbs.    PALPATION  + tenderness to palpation throughout the cervical spine and at the occiput bilaterally  + tenderness over periscapular region or shoulder girdles.    No bony deformities or step offs palpated along the cervical spinous processes.     ROM  Active flexion to  45°.    Active extension to 50°.    Active rotation to 90° bilaterally.    Active sidebending to 45° bilaterally.  Pain present with rotation and flexion    SPECIAL TESTING  Negative Spurlings test.  Negative Lhermittes test.  Negative shoulder abduction relief sign.  Negative upper limb tension tests    Posture:  Upright and Anterior head carriage  Gait: Non-antalgic     TART (Tissue texture abnormality, Asymmetry,  Restriction of motion and/or Tenderness) changes:    Head: Occipitoatlantal (OA) Joint ES-left, R-right     Cervical Spine  Thoracic Spine  Lumbar Spine   C1 Neutral T1 ERSL L1 Neutral   C2 Neutral T2 ERSL L2 Neutral   C3 ERSR T3 Neutral L3 Neutral   C4 ERSR T4 Neutral L4 Neutral   C5 Neutral T5 Neutral L5 Neutral   C6 ERSL T6 FRSR     C7 ERSL T7 Neutral       T8 Neutral       T9 Neutral       T10 Neutral       T11 Neutral       T12 Neutral       Fascial trigger bands from mid trapezius up to the occiput along the cervical paraspinals bilaterally    Ribs:   Superior rib 1 bilaterally   Myofascial restriction bilateral upper rib cage    Upper Extremity:   Periscapular myofascial restriction more pronounced on the right   Fascial herniated trigger point at the medial aspect of the left trapezius muscle    Abdomen:    Pelvis:      Sacrum:    Lower Extremity:      Key   F= Flexed   E = Extended   R = Rotated   N = Neutral   S = Sidebent   TTA = tissue texture abnormality   L/R/B = left/right/bilateral (last letter)       NEUROVASCULAR  Full strength with arm abduction, elbow flexion, wrist extension, elbow extension, finger flexion, and finger abduction.    Sensation intact to light touch in the C5-T1 dermatomes bilaterally.   Normal gait without wide base of support or scissoring.  2+ and symmetric radial and ulnar pulses at the wrists bilaterally.   Capillary refill intact at <2 seconds in all upper limb digits.      IMAGIN. MRI obtained 10/12/2017 due to cervical spine pain   2. MRI images were  reviewed personally by me and then directly with patient.  3. FINDINGS: MRI images obtained demonstrate mild cervical spondylosis, no spinal canal stenosis  4. IMPRESSION: As above.     1. X-ray obtained 09/15/2021 at [facility] due to bilateral neck pain   2. X-ray images were reviewed personally by me and then directly with patient  3. FINDINGS: X-ray images obtained demonstrate trace retrolisthesis at C5 vertebral body.  Increased moderate degenerative disc disease at C5-6 with circumferential osteophyte formation and endplate irregularity.  Mild facet arthropathy at this level as well.  Anterior osteophyte formation and ligamentous ossification at C4-5.  No prominent soft tissue findings detected.  4. IMPRESSION:  Multilevel Cervical spondylosis, most prominent at the C5-6 level.    ASSESSMENT:      ICD-10-CM ICD-9-CM   1. Cervicalgia  M54.2 723.1   2. Cervical spondylosis  M47.812 721.0   3. Cervicogenic headache  G44.86 784.0   4. Somatic dysfunction of upper extremity  M99.07 739.7   5. Somatic dysfunction of cervical region  M99.01 739.1   6. Cranial somatic dysfunction  M99.00 739.0   7. Somatic dysfunction of thoracic region  M99.02 739.2   8. Somatic dysfunction of rib cage region  M99.08 739.8         PLAN:  1-3.  Cervicalgia/cervical spondylosis/cervicogenic headache -  new to provider    - Beverley admits to bilateral trapezius and neck pain for the past several years that has been progressively worsening. She has seen Dr. Raisa Rubin in the past for this and had OMT which was helpful for her.     - XRs obtained 09/15/2021 and MRI obtained 10/12/2017 and were personally reviewed. See above for further detail.    - Based on her description of pain/body language and somatic dysfunction identified on exam, I discussed osteopathic manipulation as a treatment option today. She consents to evaluation and treatment. See below.    -  Continue with HEP for neck range of motion, shoulder circles, [place]e, snow angels,  cat/cow prescribed today. Handouts provided, explained, and exercises were demonstrated as needed. Encouraged to do daily.       4-8.  Somatic dysfunction of cervical, cranial, thoracic, ribcage, upper extremity regions -     - OMT 5-6 regions. Oral consent obtained. Reviewed benefits and potential side effects. OMT indicated today due to signs and symptoms as well as local and remote somatic dysfunction findings and their related neurokinetic, lymphatic, fascial and/or arteriovenous body connections. OMT techniques used: Soft Tissue, Myofascial Release, Muscle Energy, Fascial Distortion Model, and Articulatory. Treatment was tolerated well. Improvement noted in segmental mobility post-treatment in dysfunctional regions. There were no adverse events and no complications immediately following treatment. Advised plenty of water to help alleviate soreness.      Future planning includes - possibly more OMT if helpful and if indicated    All questions were answered to the best of my ability and all concerns were addressed at this time.    Follow up in 2-3 weeks for above, or sooner if needed.      This note is dictated using the M*Modal Fluency Direct word recognition program. There are word recognition mistakes that are occasionally missed on review.      Total time spent face-to face with patient counseling or coordinating care including prognosis, differential diagnosis, risks and benefits of treatment, instructions, compliance risk reductions as well as non-face-to-face time personally spent reviewing medial record, medical documentation, and coordination of care.     EST MINUTES X   [id number] 10-19    [id number] 20-29    [id number] 30-39 X   [id number] 40-54    NEW     [id number] 15-29    90068 30-44    [id number] 45-59    [id number] 60-74    PHONE      5-10    [id number] 11-20    03486 21-30

## 2022-12-06 ENCOUNTER — OFFICE VISIT (OUTPATIENT)
Dept: SPORTS MEDICINE | Facility: CLINIC | Age: 57
End: 2022-12-06
Payer: COMMERCIAL

## 2022-12-06 VITALS
WEIGHT: 150 LBS | BODY MASS INDEX: 24.99 KG/M2 | DIASTOLIC BLOOD PRESSURE: 89 MMHG | SYSTOLIC BLOOD PRESSURE: 146 MMHG | HEIGHT: 65 IN | HEART RATE: 60 BPM

## 2022-12-06 DIAGNOSIS — M99.02 SOMATIC DYSFUNCTION OF THORACIC REGION: ICD-10-CM

## 2022-12-06 DIAGNOSIS — M99.00 CRANIAL SOMATIC DYSFUNCTION: ICD-10-CM

## 2022-12-06 DIAGNOSIS — M54.2 CERVICALGIA: Primary | ICD-10-CM

## 2022-12-06 DIAGNOSIS — M47.812 CERVICAL SPONDYLOSIS: ICD-10-CM

## 2022-12-06 DIAGNOSIS — M99.07 SOMATIC DYSFUNCTION OF UPPER EXTREMITY: ICD-10-CM

## 2022-12-06 DIAGNOSIS — M99.08 SOMATIC DYSFUNCTION OF RIB CAGE REGION: ICD-10-CM

## 2022-12-06 DIAGNOSIS — G44.86 CERVICOGENIC HEADACHE: ICD-10-CM

## 2022-12-06 DIAGNOSIS — M99.01 SOMATIC DYSFUNCTION OF CERVICAL REGION: ICD-10-CM

## 2022-12-06 PROCEDURE — 1160F PR REVIEW ALL MEDS BY PRESCRIBER/CLIN PHARMACIST DOCUMENTED: ICD-10-PCS | Mod: CPTII,S$GLB,, | Performed by: ORTHOPAEDIC SURGERY

## 2022-12-06 PROCEDURE — 99214 OFFICE O/P EST MOD 30 MIN: CPT | Mod: 25,S$GLB,, | Performed by: ORTHOPAEDIC SURGERY

## 2022-12-06 PROCEDURE — 4010F PR ACE/ARB THEARPY RXD/TAKEN: ICD-10-PCS | Mod: CPTII,S$GLB,, | Performed by: ORTHOPAEDIC SURGERY

## 2022-12-06 PROCEDURE — 3079F DIAST BP 80-89 MM HG: CPT | Mod: CPTII,S$GLB,, | Performed by: ORTHOPAEDIC SURGERY

## 2022-12-06 PROCEDURE — 3008F BODY MASS INDEX DOCD: CPT | Mod: CPTII,S$GLB,, | Performed by: ORTHOPAEDIC SURGERY

## 2022-12-06 PROCEDURE — 98927 PR OSTEOPATHIC MANIP,5-6 BODY REGN: ICD-10-PCS | Mod: S$GLB,,, | Performed by: ORTHOPAEDIC SURGERY

## 2022-12-06 PROCEDURE — 99999 PR PBB SHADOW E&M-EST. PATIENT-LVL III: CPT | Mod: PBBFAC,,, | Performed by: ORTHOPAEDIC SURGERY

## 2022-12-06 PROCEDURE — 1160F RVW MEDS BY RX/DR IN RCRD: CPT | Mod: CPTII,S$GLB,, | Performed by: ORTHOPAEDIC SURGERY

## 2022-12-06 PROCEDURE — 3077F SYST BP >= 140 MM HG: CPT | Mod: CPTII,S$GLB,, | Performed by: ORTHOPAEDIC SURGERY

## 2022-12-06 PROCEDURE — 99999 PR PBB SHADOW E&M-EST. PATIENT-LVL III: ICD-10-PCS | Mod: PBBFAC,,, | Performed by: ORTHOPAEDIC SURGERY

## 2022-12-06 PROCEDURE — 3008F PR BODY MASS INDEX (BMI) DOCUMENTED: ICD-10-PCS | Mod: CPTII,S$GLB,, | Performed by: ORTHOPAEDIC SURGERY

## 2022-12-06 PROCEDURE — 98927 OSTEOPATH MANJ 5-6 REGIONS: CPT | Mod: S$GLB,,, | Performed by: ORTHOPAEDIC SURGERY

## 2022-12-06 PROCEDURE — 1159F MED LIST DOCD IN RCRD: CPT | Mod: CPTII,S$GLB,, | Performed by: ORTHOPAEDIC SURGERY

## 2022-12-06 PROCEDURE — 3077F PR MOST RECENT SYSTOLIC BLOOD PRESSURE >= 140 MM HG: ICD-10-PCS | Mod: CPTII,S$GLB,, | Performed by: ORTHOPAEDIC SURGERY

## 2022-12-06 PROCEDURE — 1159F PR MEDICATION LIST DOCUMENTED IN MEDICAL RECORD: ICD-10-PCS | Mod: CPTII,S$GLB,, | Performed by: ORTHOPAEDIC SURGERY

## 2022-12-06 PROCEDURE — 3079F PR MOST RECENT DIASTOLIC BLOOD PRESSURE 80-89 MM HG: ICD-10-PCS | Mod: CPTII,S$GLB,, | Performed by: ORTHOPAEDIC SURGERY

## 2022-12-06 PROCEDURE — 99214 PR OFFICE/OUTPT VISIT, EST, LEVL IV, 30-39 MIN: ICD-10-PCS | Mod: 25,S$GLB,, | Performed by: ORTHOPAEDIC SURGERY

## 2022-12-06 PROCEDURE — 4010F ACE/ARB THERAPY RXD/TAKEN: CPT | Mod: CPTII,S$GLB,, | Performed by: ORTHOPAEDIC SURGERY

## 2022-12-06 NOTE — PROGRESS NOTES
CC: neck pain     Beverley is here today for follow up evaluation of her neck pain. Patient reports her pain is 3/10 today. She admits to appreciating approximately 2 to 3 weeks of improvement in her pain with OMT at her most recent visit. She has been somewhat compliant with her HEP and has been using a Craniocradle but would like to review its use today. She gestures to the occiput and trapezius muscle bilaterally when asked where she hurts. She denies new injury or trauma since her last visit.     Recall from visit on 11/02/2022  57 y.o. Female presents today for evaluation of her neck pain. She admits to neck pain for the past several years that has progressively worsened. She has been evaluated for this problem by Dr. Raisa Rubin and had OMT, following which she appreciated several weeks of improvement in her pain following. When asked where she hurts she gestures to the trapezius muscle bilaterally and sweeps her hands to the occiput bilaterally.     How long:  Several years  What makes it better: Rest, OMT, stretching and physical therapy  What makes it worse: Laying down, trying to sleep, turning her head  Does it radiate: Denies  Attempted treatments: She has previously attended physical therapy for this problem and had OMT which has been somewhat helpful. She denies currently taking any medications for this problem and denies history of neck injection/surgery.   Pain score: 5/10  Any mechanical symptoms: Admits to occasionally appreciating a popping sensation.   Feelings of instability: Denies  Affecting ADLs: Admits to this problem affecting her ability to perform ADLs as it is negatively impacting her ability to sleep.       PAST MEDICAL HISTORY:   Past Medical History:   Diagnosis Date    Breast cancer     Genital herpes, unspecified     Hypertension        PAST SURGICAL HISTORY:   Past Surgical History:   Procedure Laterality Date    BREAST LUMPECTOMY      DILATION AND CURETTAGE OF UTERUS      ENDOMETRIAL  "ABLATION      FOOT SURGERY         FAMILY HISTORY:   Family History   Problem Relation Age of Onset    Breast cancer Paternal Grandmother     Colon cancer Neg Hx     Ovarian cancer Neg Hx        SOCIAL HISTORY:   Social History     Socioeconomic History    Marital status:    Tobacco Use    Smoking status: Never    Smokeless tobacco: Never   Substance and Sexual Activity    Alcohol use: No       MEDICATIONS:     Current Outpatient Medications:     CALCIUM ORAL, Take by mouth., Disp: , Rfl:     DULoxetine (CYMBALTA) 30 MG capsule, TK 1 C PO D, Disp: , Rfl: 3    ergocalciferol, vitamin D2, (VITAMIN D ORAL), Take by mouth., Disp: , Rfl:     letrozole (FEMARA) 2.5 mg Tab, Take 2.5 mg by mouth once daily., Disp: , Rfl:     valACYclovir (VALTREX) 500 MG tablet, TAKE 1 TABLET(500 MG) BY MOUTH EVERY DAY, Disp: 30 tablet, Rfl: 11    ALLERGIES:   Review of patient's allergies indicates:  No Known Allergies     PHYSICAL EXAMINATION:  BP (!) 146/89   Pulse 60   Ht 5' 5" (1.651 m)   Wt 68 kg (150 lb)   BMI 24.96 kg/m²   Vitals signs and nursing note have been reviewed.  General: In no acute distress, well developed, well nourished, no diaphoresis  Eyes: EOM full and smooth, no eye redness or discharge  HENT: normocephalic and atraumatic, neck supple, trachea midline, no nasal discharge, no external ear redness or discharge  Cardiovascular: 2+ and symmetric radial bilaterally, no LE edema  Lungs: respirations non-labored, no conversational dyspnea   Abd: non-distended, no rigidity  MSK: no amputation or deformity, no swelling of extremities  Neuro: AAOx3, CN2-12 grossly intact  Skin: No rashes, warm and dry  Psychiatric: cooperative, pleasant, mood and affect appropriate for age    MUSCULOSKELETAL    CERVICAL SPINE    INSPECTION  Anterior head carriage.    Normal cervicothoracolumbar curves.    No obvious pelvic obliquity while standing.    No edema, erythema, or ecchymosis noted in cervical spine or shoulder girdle " regions.    No atrophy noted in the upper limbs.    PALPATION  + tenderness to palpation throughout the cervical spine and at the occiput  more pronounced on the right  + tenderness over periscapular region or shoulder girdles.    No bony deformities or step offs palpated along the cervical spinous processes.     ROM  Active flexion to 45°.    Active extension to 50°.    Active rotation to 90° bilaterally.    Active sidebending to 45° bilaterally.  Pain present with rotation and flexion    SPECIAL TESTING  Negative Spurlings test.  Negative Lhermittes test.  Negative shoulder abduction relief sign.  Negative upper limb tension tests    Posture:  Upright and Anterior head carriage  Gait: Non-antalgic     TART (Tissue texture abnormality, Asymmetry,  Restriction of motion and/or Tenderness) changes:    Head: Occipitoatlantal (OA) Joint ES-left, R-right   Myofascial restriction right occiput     Cervical Spine  Thoracic Spine  Lumbar Spine   C1 RR T1 ERSL L1 Neutral   C2 FRSR T2 Neutral L2 Neutral   C3 Neutral T3 Neutral L3 Neutral   C4 Neutral T4 NSRRL L4 Neutral   C5 Neutral T5 NSRRL L5 Neutral   C6 Neutral T6 NSRRL     C7 ERSL T7 NSRRL       T8 Neutral       T9 Neutral       T10 Neutral       T11 Neutral       T12 Neutral       Fascial trigger bands from mid trapezius up to the occiput along the cervical paraspinals on the right    Ribs:  Superior rib 1 right  External torsion rib 4 on the right  Myofascial restriction bilateral upper rib cage    Upper Extremity:  Periscapular myofascial restriction more pronounced on the right  Fascial herniated trigger point at the lateral aspect of the right trapezius muscle    Abdomen:    Pelvis:    Sacrum:    Lower Extremity:      Key   F= Flexed   E = Extended   R = Rotated   N = Neutral   S = Sidebent   TTA = tissue texture abnormality   L/R/B = left/right/bilateral (last letter)       NEUROVASCULAR  Full strength with arm abduction, elbow flexion, wrist extension, elbow  extension, finger flexion, and finger abduction.    Sensation intact to light touch in the C5-T1 dermatomes bilaterally.   Normal gait without wide base of support or scissoring.  2+ and symmetric radial and ulnar pulses at the wrists bilaterally.   Capillary refill intact at <2 seconds in all upper limb digits.      IMAGIN. MRI obtained 10/12/2017 due to cervical spine pain   2. MRI images were reviewed personally by me and then directly with patient.  3. FINDINGS: MRI images obtained demonstrate mild cervical spondylosis, no spinal canal stenosis  4. IMPRESSION: As above.     1. X-ray obtained 09/15/2021 at [facility] due to bilateral neck pain   2. X-ray images were reviewed personally by me and then directly with patient  3. FINDINGS: X-ray images obtained demonstrate trace retrolisthesis at C5 vertebral body.  Increased moderate degenerative disc disease at C5-6 with circumferential osteophyte formation and endplate irregularity.  Mild facet arthropathy at this level as well.  Anterior osteophyte formation and ligamentous ossification at C4-5.  No prominent soft tissue findings detected.  4. IMPRESSION:  Multilevel Cervical spondylosis, most prominent at the C5-6 level.      ASSESSMENT:      ICD-10-CM ICD-9-CM   1. Cervicalgia  M54.2 723.1   2. Cervical spondylosis  M47.812 721.0   3. Cervicogenic headache  G44.86 784.0   4. Somatic dysfunction of cervical region  M99.01 739.1   5. Cranial somatic dysfunction  M99.00 739.0   6. Somatic dysfunction of thoracic region  M99.02 739.2   7. Somatic dysfunction of rib cage region  M99.08 739.8   8. Somatic dysfunction of upper extremity  M99.07 739.7         PLAN:  1-3.  Cervicalgia/cervical spondylosis/cervicogenic headache -  improved, then deteriorated    - Beverley admits to bilateral trapezius and neck pain for the past several years that has been progressively worsening. She has seen Dr. Raisa Rubin in the past for this and had OMT which was helpful for her.     -  She admits to appreciating approximately 2 weeks of improvement in her pain following OMT at her most recent visit. She has been somewhat compliant with her HEP. She has been using the Craniocradle.     - Based on her description of pain/body language and somatic dysfunction identified on exam, I discussed osteopathic manipulation as a treatment option today. She consents to evaluation and treatment. See below.    - Continue with HEP for neck range of motion, shoulder circles, Ethiopian, snow angels, cat/cow prescribed at prior visits.  Exercises reviewed today.      4-8.  Somatic dysfunction of cervical, cranial, thoracic, ribcage, upper extremity regions -     - OMT 5-6 regions. Oral consent obtained. Reviewed benefits and potential side effects. OMT indicated today due to signs and symptoms as well as local and remote somatic dysfunction findings and their related neurokinetic, lymphatic, fascial and/or arteriovenous body connections. OMT techniques used: Soft Tissue, Myofascial Release, Muscle Energy, Still's Technique, Cranial , and Fascial Distortion Model. Treatment was tolerated well. Improvement noted in segmental mobility post-treatment in dysfunctional regions. There were no adverse events and no complications immediately following treatment. Advised plenty of water to help alleviate soreness.      Future planning includes - possibly more OMT if helpful and if indicated, add PT, referral to discuss greater occipital nerve blocks/hydrodissection    All questions were answered to the best of my ability and all concerns were addressed at this time.    Follow up as needed.       This note is dictated using the M*Modal Fluency Direct word recognition program. There are word recognition mistakes that are occasionally missed on review.      Total time spent face-to face with patient counseling or coordinating care including prognosis, differential diagnosis, risks and benefits of treatment, instructions,  compliance risk reductions as well as non-face-to-face time personally spent reviewing medial record, medical documentation, and coordination of care.     EST MINUTES X   [id number] 10-19    [id number] 20-29    [id number] 30-39 X   [id number] 40-54    NEW     [id number] 15-29    54130 30-44    [id number] 45-59    [id number] 60-74    PHONE      5-10    [id number] 11-20    [id number] 21-30

## 2022-12-19 ENCOUNTER — OFFICE VISIT (OUTPATIENT)
Dept: PSYCHIATRY | Facility: CLINIC | Age: 57
End: 2022-12-19
Payer: COMMERCIAL

## 2022-12-19 DIAGNOSIS — F41.9 ANXIETY DISORDER, UNSPECIFIED TYPE: Primary | ICD-10-CM

## 2022-12-19 PROCEDURE — 4010F ACE/ARB THERAPY RXD/TAKEN: CPT | Mod: CPTII,95,, | Performed by: SOCIAL WORKER

## 2022-12-19 PROCEDURE — 90834 PR PSYCHOTHERAPY W/PATIENT, 45 MIN: ICD-10-PCS | Mod: 95,,, | Performed by: SOCIAL WORKER

## 2022-12-19 PROCEDURE — 4010F PR ACE/ARB THEARPY RXD/TAKEN: ICD-10-PCS | Mod: CPTII,95,, | Performed by: SOCIAL WORKER

## 2022-12-19 PROCEDURE — 90834 PSYTX W PT 45 MINUTES: CPT | Mod: 95,,, | Performed by: SOCIAL WORKER

## 2022-12-19 NOTE — PROGRESS NOTES
"Telemedicine Visit  The patient location is: [place]  The chief complaint leading to consultation is: anxiety    Visit type: audiovisual    Face to Face time with patient: 45  55 minutes of total time spent on the encounter, which includes face to face time and non-face to face time preparing to see the patient (eg, review of tests), Obtaining and/or reviewing separately obtained history, Documenting clinical information in the electronic or other health record, Independently interpreting results (not separately reported) and communicating results to the patient/family/caregiver, or Care coordination (not separately reported).     Each patient to whom he or she provides medical services by telemedicine is:  (1) informed of the relationship between the physician and patient and the respective role of any other health care provider with respect to management of the patient; and (2) notified that he or she may decline to receive medical services by telemedicine and may withdraw from such care at any time.    Notes:    [facility]ecilia Arciniega [facility]  Individual Psychotherapy   Beverley Porter,  2022    Site: [facility] - Dept of Psychiatry    Therapeutic Intervention: Met with patient for individual psychotherapy follow up.    Chief complaint/reason for encounter: depression, anxiety and interpersonal     Content of current session: Met with Beverley for follow up session.    2022   -  Beverley shared that she found her friend dead in her home due to an alcohol-related fall (she  of blood loss).  Beverley is feeling very apathetic and questioning her own purpose - she clearly states "I'm not thinking of hurting myself or killing myself", but sometimes she would just like to go away and be alone for a little while.  Beverley acknowledges not getting a sense of meaning or fulfillment from her work (on rare occasions) and feels she's letting those above her down.  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  She shares that she had surgery on her legs and not exercising for a little while.  She determined during this session to resume walking - putting it on her schedule like any other appointment.    She shares that son Kwadwo has gotten a job teaching PE at [employer].      Next session we will focus on flexibility and stuckness.    FU as scheduled              From Previous Session:  8- Beverley talked about having a really bad day on Sunday.  She states that she got so frustrated with Kwadwo and his resistance to doing anything that she drank more than she even usually drinks.  Focus of this session was much of the same how frustrated she is with her son's laziness and lack of motivation.  Therapist provided education regarding the stages of change model and discussed what it means to be in pre contemplation.  Contrasted where an is, she wants to be in action, verses where Kwadwo is.  We discussed the difference and the power differential.  And had a meeting and we had to cut this session at 30 minutes.  Left her to contemplate where will change start and what will happen if she does not initiate change.    Follow-up as scheduled    8- Beverley shares that little has changed since we last met two months ago.  Younger son Aj has completed education in [employer] (goal is to become a ) and moved back into the family home.  Renovation continues, slowly.  Beverley states that she continues to drink and that continues to be a problem between Beverley and  Braulio.  Explored reasons things are no different.  Primary concern continues to be son Kwadwo (25) not working, smokes [employer]J (has medical card), source of frustration.  Beverley resents that Braulio won't "do anything" to support her, reasons that he resents her for never enforcing expectations on Kwadwo.  Processed some feelings associated with resistance to communicating with  " about need for support (each needs the others' support).  Beverley proposed (perhaps off the cuff) putting Kwadwo out of the house 8-5 Monday - Friday.    PHQ9 = 9 Mild  GAD7 = 11 Moderate      FU as scheduled          Treatment plan:  Target symptoms: depression  Why chosen therapy is appropriate versus another modality: patient responds to this modality  Outcome monitoring methods: self-report, observation  Therapeutic intervention type: insight oriented psychotherapy, supportive psychotherapy    Risk parameters:  Patient reports no suicidal ideation  Patient reports no homicidal ideation  Patient reports no self-injurious behavior  Patient reports no violent behavior      Patient's response to intervention:  The patient's response to intervention is accepting.    Progress toward goals and other mental status changes:  The patient's progress toward goals is poor.    Diagnosis:     ICD-10-CM ICD-9-CM   1. Anxiety disorder, unspecified type  F41.9 300.00         Plan: Pt plans to continue individual psychotherapy    Return to clinic:  As scheduled    Length of Service (minutes): 45

## 2023-06-28 ENCOUNTER — OFFICE VISIT (OUTPATIENT)
Dept: PSYCHIATRY | Facility: CLINIC | Age: 58
End: 2023-06-28
Payer: COMMERCIAL

## 2023-06-28 ENCOUNTER — PATIENT MESSAGE (OUTPATIENT)
Dept: PSYCHIATRY | Facility: CLINIC | Age: 58
End: 2023-06-28
Payer: COMMERCIAL

## 2023-06-28 DIAGNOSIS — F33.1 MDD (MAJOR DEPRESSIVE DISORDER), RECURRENT EPISODE, MODERATE: Primary | ICD-10-CM

## 2023-06-28 DIAGNOSIS — F41.9 ANXIETY DISORDER, UNSPECIFIED TYPE: ICD-10-CM

## 2023-06-28 DIAGNOSIS — F10.90 ALCOHOL USE DISORDER: ICD-10-CM

## 2023-06-28 PROCEDURE — 90785 PSYTX COMPLEX INTERACTIVE: CPT | Mod: 95,,, | Performed by: SOCIAL WORKER

## 2023-06-28 PROCEDURE — 4010F PR ACE/ARB THEARPY RXD/TAKEN: ICD-10-PCS | Mod: CPTII,95,, | Performed by: SOCIAL WORKER

## 2023-06-28 PROCEDURE — 90785 PR INTERACTIVE COMPLEXITY: ICD-10-PCS | Mod: 95,,, | Performed by: SOCIAL WORKER

## 2023-06-28 PROCEDURE — 4010F ACE/ARB THERAPY RXD/TAKEN: CPT | Mod: CPTII,95,, | Performed by: SOCIAL WORKER

## 2023-06-28 PROCEDURE — 90837 PSYTX W PT 60 MINUTES: CPT | Mod: 95,,, | Performed by: SOCIAL WORKER

## 2023-06-28 PROCEDURE — 90837 PR PSYCHOTHERAPY W/PATIENT, 60 MIN: ICD-10-PCS | Mod: 95,,, | Performed by: SOCIAL WORKER

## 2023-06-28 NOTE — PROGRESS NOTES
"Telemedicine Visit  The patient location is: [place]  The chief complaint leading to consultation is: anxiety    Visit type: audiovisual    Face to Face time with patient: 55  70 minutes of total time spent on the encounter, which includes face to face time and non-face to face time preparing to see the patient (eg, review of tests), Obtaining and/or reviewing separately obtained history, Documenting clinical information in the electronic or other health record, Independently interpreting results (not separately reported) and communicating results to the patient/family/caregiver, or Care coordination (not separately reported).     Each patient to whom he or she provides medical services by telemedicine is:  (1) informed of the relationship between the physician and patient and the respective role of any other health care provider with respect to management of the patient; and (2) notified that he or she may decline to receive medical services by telemedicine and may withdraw from such care at any time.    Notes:    Cecilia Arciniega, [facility]  Individual Psychotherapy   Beverley YEISON Porter,  6/28/2023    Site: [facility] - Dept of Psychiatry    Therapeutic Intervention: Met with patient for individual psychotherapy follow up.    Chief complaint/reason for encounter: depression, anxiety and interpersonal     Content of current session: Met with Beverley for follow up session.    6/28/2023  -  Drinking.  Beverley shares that her relationship with  Braulio is deteriorating.  She states that she tried cutting back on her drinking for awhile and things did seem to be better, but "that's the only thing I have that is just for me".  She talks about giving up half of her closet space when her daughter had to move in, having to share her bathroom with the kids, not having anything that is just for her.  We processed feelings associated with what she might really be looking for or trying to avoid - connection/disconnection.  Once again " "talked about failed efforts to create lasting change.  Pt continues to minimize role of alcohol in her life.  Provided her with some information about [facility] and encouraged Beverley to look further into treatment.        This session involved Interactive Complexity (07587); that is, specific communication factors complicated the delivery of the procedure.  In order to facilitate understanding, written communication regarding [facility] and [facility] blog  entitled What Are Alcohol Disorders and How Do They Damage Your Body? was shared with patient via the [facility] emani.    FU as scheduled        From Previous Session:  2022   -  Beverley shared that she found her friend dead in her home due to an alcohol-related fall (she  of blood loss).  Beverley is feeling very apathetic and questioning her own purpose - she clearly states "I'm not thinking of hurting myself or killing myself", but sometimes she would just like to go away and be alone for a little while.  Beverley acknowledges not getting a sense of meaning or fulfillment from her work (on rare occasions) and feels she's letting those above her down.  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  She shares that she had surgery on her legs and not exercising for a little while.  She determined during this session to resume walking - putting it on her schedule like any other appointment.    She shares that son Kwadwo has gotten a job teaching PE at [employer].      Next session we will focus on flexibility and stuckness.    FU as scheduled      2022 Beverley talked about having a really bad day on .  She states that she got so frustrated with Kwadwo and his resistance to doing anything that she drank more than she even usually drinks.  Focus of this session was much of the same how frustrated she is with her son's laziness and lack of " "motivation.  Therapist provided education regarding the stages of change model and discussed what it means to be in pre contemplation.  Contrasted where an is, she wants to be in action, verses where Kwadwo is.  We discussed the difference and the power differential.  And had a meeting and we had to cut this session at 30 minutes.  Left her to contemplate where will change start and what will happen if she does not initiate change.    Follow-up as scheduled    8- Beverley shares that little has changed since we last met two months ago.  Younger son Aj has completed education in [employer] (goal is to become a ) and moved back into the family home.  Renovation continues, slowly.  Beverley states that she continues to drink and that continues to be a problem between Beverley and  Braulio.  Explored reasons things are no different.  Primary concern continues to be son Kwadwo (25) not working, smokes [employer] (has medical card), source of frustration.  Beverley resents that Braulio won't "do anything" to support her, reasons that he resents her for never enforcing expectations on Kwadwo.  Processed some feelings associated with resistance to communicating with  about need for support (each needs the others' support).  Beverley proposed (perhaps off the cuff) putting Kwadwo out of the house 8-5 Monday - Friday.    PHQ9 = 9 Mild  GAD7 = 11 Moderate      FU as scheduled          Treatment plan:  Target symptoms: depression  Why chosen therapy is appropriate versus another modality: patient responds to this modality  Outcome monitoring methods: self-report, observation  Therapeutic intervention type: insight oriented psychotherapy, supportive psychotherapy    Risk parameters:  Patient reports no suicidal ideation  Patient reports no homicidal ideation  Patient reports no self-injurious behavior  Patient reports no violent behavior      Patient's response to intervention:  The patient's response to intervention is " accepting.    Progress toward goals and other mental status changes:  The patient's progress toward goals is poor.    Diagnosis:     ICD-10-CM ICD-9-CM   1. MDD (major depressive disorder), recurrent episode, moderate  F33.1 296.32   2. Anxiety disorder, unspecified type  F41.9 300.00   3. Alcohol use disorder  F10.90 V49.89           Plan: Pt plans to continue individual psychotherapy    Return to clinic:  As scheduled    Length of Service (minutes): 60

## 2024-06-11 ENCOUNTER — HOSPITAL ENCOUNTER (OUTPATIENT)
Dept: RADIOLOGY | Facility: HOSPITAL | Age: 59
Discharge: HOME | End: 2024-06-11
Attending: ORTHOPAEDIC SURGERY
Payer: COMMERCIAL

## 2024-06-11 ENCOUNTER — OFFICE VISIT (OUTPATIENT)
Dept: SPORTS MEDICINE | Facility: CLINIC | Age: 59
End: 2024-06-11
Payer: COMMERCIAL

## 2024-06-11 ENCOUNTER — TELEPHONE (OUTPATIENT)
Dept: SPORTS MEDICINE | Facility: CLINIC | Age: 59
End: 2024-06-11
Payer: COMMERCIAL

## 2024-06-11 VITALS
SYSTOLIC BLOOD PRESSURE: 116 MMHG | HEIGHT: 65 IN | DIASTOLIC BLOOD PRESSURE: 69 MMHG | WEIGHT: 158.75 LBS | HEART RATE: 76 BPM | BODY MASS INDEX: 26.45 KG/M2

## 2024-06-11 DIAGNOSIS — M79.674 GREAT TOE PAIN, RIGHT: ICD-10-CM

## 2024-06-11 DIAGNOSIS — M79.674 GREAT TOE PAIN, RIGHT: Primary | ICD-10-CM

## 2024-06-11 PROCEDURE — 4010F ACE/ARB THERAPY RXD/TAKEN: CPT | Mod: CPTII,S$GLB,, | Performed by: ORTHOPAEDIC SURGERY

## 2024-06-11 PROCEDURE — 1160F RVW MEDS BY RX/DR IN RCRD: CPT | Mod: CPTII,S$GLB,, | Performed by: ORTHOPAEDIC SURGERY

## 2024-06-11 PROCEDURE — 73660 X-RAY EXAM OF TOE(S): CPT | Mod: 26,59,RT, | Performed by: RADIOLOGY

## 2024-06-11 PROCEDURE — 99999 PR PBB SHADOW E&M-EST. PATIENT-LVL III: CPT | Mod: PBBFAC,,, | Performed by: ORTHOPAEDIC SURGERY

## 2024-06-11 PROCEDURE — 3078F DIAST BP <80 MM HG: CPT | Mod: CPTII,S$GLB,, | Performed by: ORTHOPAEDIC SURGERY

## 2024-06-11 PROCEDURE — 99204 OFFICE O/P NEW MOD 45 MIN: CPT | Mod: S$GLB,,, | Performed by: ORTHOPAEDIC SURGERY

## 2024-06-11 PROCEDURE — 73660 X-RAY EXAM OF TOE(S): CPT | Mod: TC,59,RT

## 2024-06-11 PROCEDURE — 3008F BODY MASS INDEX DOCD: CPT | Mod: CPTII,S$GLB,, | Performed by: ORTHOPAEDIC SURGERY

## 2024-06-11 PROCEDURE — 3074F SYST BP LT 130 MM HG: CPT | Mod: CPTII,S$GLB,, | Performed by: ORTHOPAEDIC SURGERY

## 2024-06-11 PROCEDURE — 1159F MED LIST DOCD IN RCRD: CPT | Mod: CPTII,S$GLB,, | Performed by: ORTHOPAEDIC SURGERY

## 2024-06-11 PROCEDURE — 73630 X-RAY EXAM OF FOOT: CPT | Mod: TC,RT

## 2024-06-11 NOTE — PROGRESS NOTES
Subjective:     Chief Complaint: Beverley Porter is a 59 y.o. female who had concerns including Toe Pain (Right great toe ).    HPI    Patient presents to clinic with acute right foot and toe pain x 2 weeks. Patient states she was ascending steps when her toe struck a step with significant force.  This was followed by extreme pain, bruising, and swelling, worse along the dorsal aspect of the great toe.  This has decreased in some severity, but has not completely resolved.  She rates the pain as 5/10 today.  She has attempted multiple conservative measures that include activity modification, ice & elevation, and oral medications (anti-inflammatories).  Is affecting ADLs and limiting desired level of activity. Denies numbness, tingling, radiation, and inability to bear weight. She is here today to discuss treatment options.    History of bunionectomy of right foot    Review of Systems   Constitutional: Negative.   HENT: Negative.     Eyes: Negative.    Cardiovascular: Negative.    Respiratory: Negative.     Endocrine: Negative.    Hematologic/Lymphatic: Negative.    Skin: Negative.    Musculoskeletal:  Positive for joint pain, joint swelling and myalgias. Negative for falls, muscle weakness and stiffness.   Neurological: Negative.    Psychiatric/Behavioral: Negative.     Allergic/Immunologic: Negative.        Pain Related Questions  Over the past 3 days, what was your average pain during activity? (I.e. running, jogging, walking, climbing stairs, getting dressed, ect.): 8  Over the past 3 days, what was your highest pain level?: 6  Over the past 3 days, what was your lowest pain level? : 5    Other  Was the patient's HEIGHT measured or patient reported?: Patient Reported  Was the patient's WEIGHT measured or patient reported?: Measured    Objective:     General: Beverley is well-developed, well-nourished, appears stated age, in no acute distress, alert and oriented to time, place and person.     General    Vitals  reviewed.  Constitutional: She is oriented to person, place, and time. She appears well-developed and well-nourished. No distress.   HENT:   Head: Normocephalic and atraumatic.   Eyes: EOM are normal.   Cardiovascular:  Normal rate.            Pulmonary/Chest: Effort normal.   Neurological: She is alert and oriented to person, place, and time. She has normal reflexes. Coordination normal.   Psychiatric: She has a normal mood and affect. Her behavior is normal. Judgment and thought content normal.     General Musculoskeletal Exam   Gait: normal     Right Ankle/Foot Exam     Inspection   Scars: absent  Deformity: absent  Erythema: absent  Bruising: Ankle - absent Foot - present  Effusion: Ankle - absent Foot - absent  Atrophy: Ankle - absent Foot - absent    Tenderness   The patient is tender to palpation of the great toe metatarsophalangeal joint.    Pain   The patient exhibits pain of the great toe metatarsophalangeal joint.    Range of Motion   The patient has normal right ankle ROM.  Ankle Joint   Dorsiflexion:  20 normal   Plantar flexion:  50 normal   Subtalar Joint   Inversion:  50 normal   Eversion:  30 normal   Cantu Test:  negative  First MTP Joint: normal    Alignment   Knee Alignment: neutral  Midfoot Alignment: normal  Forefoot Alignment: normal    Tests   Anterior drawer: negative  Varus tilt: negative  Heel Walk: able to perform  Single Heel Rise: able to perform  External Rotation Test: negative  Squeeze Test: negative    Other   Sensation: normal  Peroneal Subluxation: negative    Comments:  TTP to proximal and distal phalanx of great toe; mild swelling noted    Left Ankle/Foot Exam   Left ankle exam is normal.    Inspection  Deformity: absent  Erythema: absent  Bruising: Ankle - absent Foot - absent  Effusion: Ankle - absent Foot - absent  Atrophy: Ankle - absent Foot - absent  Scars: absent    Range of Motion   The patient has normal left ankle ROM.   Ankle Joint  Dorsiflexion:  20 normal    Plantar flexion:  50 normal     Subtalar Joint   Inversion:  50 normal   Eversion:  30 normal   Cantu Test:  normal  First MTP Joint: normal    Alignment   Knee Alignment: neutral  Midfoot Alignment: normal  Forefoot Alignment: normal    Tests   Anterior drawer: negative  Varus tilt: negative  Heel Walk: able to perform  Tiptoe Walk: able to perform  Single Heel Rise: able to perform  External Rotation Test: negative  Squeeze Test: absent    Other   Sensation: normal  Peroneal Subluxation: negative      Muscle Strength   Right Lower Extremity   Anterior tibial:  5/5   Posterior tibial:  5/5   Gastrocsoleus:  5/5   Peroneal muscle:  5/5   EHL:  5/5  FDL: 5/5  EDL: 5/5  FHL: 5/5  Left Lower Extremity   Anterior tibial:  5/5   Posterior tibial:  5/5   Gastrocsoleus:  5/5   Peroneal muscle:  5/5   EHL:  5/5  FDL: 5/5  EDL: 5/5  FHL: 5/5    Reflexes     Left Side  Achilles:  2+  Babinski Sign:  absent  Ankle Clonus:  absent    Right Side   Achilles:  2+  Babinski Sign:  absent  Ankle Clonus:  absent    Vascular Exam     Right Pulses  Dorsalis Pedis:      2+  Posterior Tibial:      2+        Left Pulses  Dorsalis Pedis:      2+  Posterior Tibial:      2+        Radiographs right foot     My interpretation:     Questionable nondisplaced fracture line within proximal phalanx of great big toe along with soft tissue swelling; no other bony abnormality seen other than evidence of bunionectomy; some DJD seen within 1st MTP joint      Assessment:     Encounter Diagnosis   Name Primary?    Great toe pain, right Yes        Plan:     1. I made the decision to order new imaging of the extremity or extremities evaluated. I independently reviewed and interpreted the radiographs and/or MRIs today. These images were shown to the patient where I then discussed my findings in detail.    2. We discussed at length different treatment options including conservative vs surgical management. These include anti-inflammatories,  acetaminophen, rest, ice, heat, formal physical therapy including strengthening and stretching exercises, home exercise programs, dry needling, and finally surgical intervention.  After showing the patient her radiographs and explained my findings I recommended conservative measures moving forward.  This would include the use of a postop hard sole shoe to be worn at all times with the next 2 weeks.  To refrain from any high impact activities during this time.     3. Ice compress to the affected area 2-3x a day for 15-20 minutes as needed for pain management.  Tylenol p.r.n. pain    4. RTC to see Rafat Valentino PA-C in 2 weeks.  Will reassess toe pain and determine if patient may gradually return to activities at this time.      All of the patient's questions were answered. Patient was advised to call the clinic or contact me through the patient portal for any questions or concerns.       Medical Dictation software was used during the dictation of portions or the entirety of this medical record.  Phonetic or grammatic errors may exist due to the use of this software. For clarification, refer to the author of the document.        Patient questionnaires may have been collected.

## 2024-06-11 NOTE — TELEPHONE ENCOUNTER
Spoke with patient regarding 06/13 appt since provider is going to be out of office that afternoon. Patient requested to be seen today, so I rescheduled appt to 06/11.

## 2024-11-12 ENCOUNTER — OFFICE VISIT (OUTPATIENT)
Dept: PSYCHIATRY | Facility: CLINIC | Age: 59
End: 2024-11-12
Payer: COMMERCIAL

## 2024-11-12 DIAGNOSIS — F10.90 ALCOHOL USE DISORDER: Primary | ICD-10-CM

## 2024-11-12 DIAGNOSIS — F33.1 MDD (MAJOR DEPRESSIVE DISORDER), RECURRENT EPISODE, MODERATE: ICD-10-CM

## 2024-11-12 DIAGNOSIS — F41.9 ANXIETY DISORDER, UNSPECIFIED TYPE: ICD-10-CM

## 2024-11-12 PROCEDURE — 90837 PSYTX W PT 60 MINUTES: CPT | Mod: 95,,, | Performed by: SOCIAL WORKER

## 2024-11-12 PROCEDURE — 4010F ACE/ARB THERAPY RXD/TAKEN: CPT | Mod: CPTII,95,, | Performed by: SOCIAL WORKER

## 2024-11-12 NOTE — PROGRESS NOTES
"Telemedicine Visit  The patient location is: [place]  The chief complaint leading to consultation is: anxiety    Visit type: audiovisual    Face to Face time with patient: 54  70 minutes of total time spent on the encounter, which includes face to face time and non-face to face time preparing to see the patient (eg, review of tests), Obtaining and/or reviewing separately obtained history, Documenting clinical information in the electronic or other health record, Independently interpreting results (not separately reported) and communicating results to the patient/family/caregiver, or Care coordination (not separately reported).     Each patient to whom he or she provides medical services by telemedicine is:  (1) informed of the relationship between the physician and patient and the respective role of any other health care provider with respect to management of the patient; and (2) notified that he or she may decline to receive medical services by telemedicine and may withdraw from such care at any time.    Notes:    Cecilia Arciniega, [facility]  Individual Psychotherapy   Beverley Porter,  11/12/2024    Site: [facility] - Dept of Psychiatry    Therapeutic Intervention: Met with patient for individual psychotherapy follow up.    Chief complaint/reason for encounter: depression, anxiety and interpersonal     Content of current session: Met with Beverley for follow up session.    11-  -  17 months since last encounter with Beverley.  Feels her drinking is "better"; "basically told him this is me, this is who I am, and my drinking is not the problem".  Recently finds herself crying a lot, like when a certain song is played - don't know why.  Pt struggles to identify thoughts and feelings.  States that theres no reason she should feel the way she does, she has a great life.  Talked about the happiest time in her life - when kids were young, she was working, keeping house, raising kids - best time.  Now?  Fighting against " "slipping into deep depression.  Wants to do things but can't find the motivation to act on the 'want'.  Recalls mom slipping into a 2 year depression where she hardly got out of the bed - Beverley knows she can't do that because she doesn't have anyone who cares about her like her father cared about her mother.      Discussed behavioral activation - encouraged pt to join her sister-in-law (whom she feels close to) in walking a few mornings each week.  Also encouraged pt to talk with PCP (not interested in seeing psychiatrist) about her mood and possibly adding an antidepressant (ie Wellbutrin) to her medication regimen.  Discussed how some antidepressants have an activating effect that might help to motivate her.    Advised pt of my pending departure from [facility] at the end of the year.  She will discuss options with psychiatric MA.          Return to clinic:  As scheduled    Length of Service (minutes): 60          6/28/2023  -  Drinking.  Beverley shares that her relationship with  Braulio is deteriorating.  She states that she tried cutting back on her drinking for awhile and things did seem to be better, but "that's the only thing I have that is just for me".  She talks about giving up half of her closet space when her daughter had to move in, having to share her bathroom with the kids, not having anything that is just for her.  We processed feelings associated with what she might really be looking for or trying to avoid - connection/disconnection.  Once again talked about failed efforts to create lasting change.  Pt continues to minimize role of alcohol in her life.  Provided her with some information about the [facility] and encouraged Beverley to look further into treatment.        This session involved Interactive Complexity (48114); that is, specific communication factors complicated the delivery of the procedure.  In order to facilitate understanding, written communication regarding [facility] " "Program and [facility] blog  entitled What Are Alcohol Disorders and How Do They Damage Your Body? was shared with patient via the [facility] emani.    FU as scheduled        From Previous Session:  2022   -  Beverley shared that she found her friend dead in her home due to an alcohol-related fall (she  of blood loss).  Beverley is feeling very apathetic and questioning her own purpose - she clearly states "I'm not thinking of hurting myself or killing myself", but sometimes she would just like to go away and be alone for a little while.  Beverley acknowledges not getting a sense of meaning or fulfillment from her work (on rare occasions) and feels she's letting those above her down.  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  She shares that she had surgery on her legs and not exercising for a little while.  She determined during this session to resume walking - putting it on her schedule like any other appointment.    She shares that son Kwadwo has gotten a job teaching PE at [employer].      Next session we will focus on flexibility and stuckness.    FU as scheduled      2022 Beverley talked about having a really bad day on .  She states that she got so frustrated with Kwadwo and his resistance to doing anything that she drank more than she even usually drinks.  Focus of this session was much of the same how frustrated she is with her son's laziness and lack of motivation.  Therapist provided education regarding the stages of change model and discussed what it means to be in pre contemplation.  Contrasted where an is, she wants to be in action, verses where Kwadwo is.  We discussed the difference and the power differential.  And had a meeting and we had to cut this session at 30 minutes.  Left her to contemplate where will change start and what will happen if she does not initiate change.    Follow-up as " "scheduled    8- Beverley shares that little has changed since we last met two months ago.  Younger son Aj has completed education in [employer] (goal is to become a ) and moved back into the family home.  Renovation continues, slowly.  Beverley states that she continues to drink and that continues to be a problem between Beverley and  Braulio.  Explored reasons things are no different.  Primary concern continues to be son Kwadwo (25) not working, smokes MJ (has medical card), source of frustration.  Beverley resents that Braulio won't "do anything" to support her, reasons that he resents her for never enforcing expectations on Kwadwo.  Processed some feelings associated with resistance to communicating with  about need for support (each needs the others' support).  Beverley proposed (perhaps off the cuff) putting Kwadwo out of the house 8-5 Monday - Friday.    PHQ9 = 9 Mild  GAD7 = 11 Moderate      FU as scheduled          Treatment plan:  Target symptoms: depression  Why chosen therapy is appropriate versus another modality: patient responds to this modality  Outcome monitoring methods: self-report, observation  Therapeutic intervention type: insight oriented psychotherapy, supportive psychotherapy    Risk parameters:  Patient reports no suicidal ideation  Patient reports no homicidal ideation  Patient reports no self-injurious behavior  Patient reports no violent behavior      Patient's response to intervention:  The patient's response to intervention is accepting.    Progress toward goals and other mental status changes:  The patient's progress toward goals is poor.    Diagnosis:     ICD-10-CM ICD-9-CM   1. Alcohol use disorder  F10.90 V49.89   2. Anxiety disorder, unspecified type  F41.9 300.00   3. MDD (major depressive disorder), recurrent episode, moderate  F33.1 296.32             Plan: Pt plans to continue individual psychotherapy    Return to clinic:  As scheduled    Length of Service (minutes): " 60

## 2025-01-07 ENCOUNTER — PATIENT MESSAGE (OUTPATIENT)
Dept: PSYCHIATRY | Facility: CLINIC | Age: 60
End: 2025-01-07
Payer: COMMERCIAL

## 2025-04-03 ENCOUNTER — OFFICE VISIT (OUTPATIENT)
Dept: SPORTS MEDICINE | Facility: CLINIC | Age: 60
End: 2025-04-03
Payer: COMMERCIAL

## 2025-04-03 VITALS
DIASTOLIC BLOOD PRESSURE: 74 MMHG | BODY MASS INDEX: 26.45 KG/M2 | WEIGHT: 158.75 LBS | SYSTOLIC BLOOD PRESSURE: 124 MMHG | HEART RATE: 74 BPM | HEIGHT: 65 IN

## 2025-04-03 DIAGNOSIS — M99.00 CRANIAL SOMATIC DYSFUNCTION: ICD-10-CM

## 2025-04-03 DIAGNOSIS — M79.18 MYOFASCIAL PAIN: ICD-10-CM

## 2025-04-03 DIAGNOSIS — M99.02 SOMATIC DYSFUNCTION OF THORACIC REGION: ICD-10-CM

## 2025-04-03 DIAGNOSIS — M54.2 NECK PAIN: Primary | ICD-10-CM

## 2025-04-03 DIAGNOSIS — M79.10 MYALGIA: ICD-10-CM

## 2025-04-03 DIAGNOSIS — M99.01 SOMATIC DYSFUNCTION OF CERVICAL REGION: ICD-10-CM

## 2025-04-03 DIAGNOSIS — G44.86 CERVICOGENIC HEADACHE: ICD-10-CM

## 2025-04-03 DIAGNOSIS — M99.07 SOMATIC DYSFUNCTION OF UPPER EXTREMITY: ICD-10-CM

## 2025-04-03 DIAGNOSIS — M99.08 SOMATIC DYSFUNCTION OF RIB CAGE REGION: ICD-10-CM

## 2025-04-03 PROCEDURE — 1160F RVW MEDS BY RX/DR IN RCRD: CPT | Mod: CPTII,S$GLB,, | Performed by: ORTHOPAEDIC SURGERY

## 2025-04-03 PROCEDURE — 4010F ACE/ARB THERAPY RXD/TAKEN: CPT | Mod: CPTII,S$GLB,, | Performed by: ORTHOPAEDIC SURGERY

## 2025-04-03 PROCEDURE — 3078F DIAST BP <80 MM HG: CPT | Mod: CPTII,S$GLB,, | Performed by: ORTHOPAEDIC SURGERY

## 2025-04-03 PROCEDURE — 99214 OFFICE O/P EST MOD 30 MIN: CPT | Mod: 25,S$GLB,, | Performed by: ORTHOPAEDIC SURGERY

## 2025-04-03 PROCEDURE — 99999 PR PBB SHADOW E&M-EST. PATIENT-LVL III: CPT | Mod: PBBFAC,,, | Performed by: ORTHOPAEDIC SURGERY

## 2025-04-03 PROCEDURE — 98927 OSTEOPATH MANJ 5-6 REGIONS: CPT | Mod: S$GLB,,, | Performed by: ORTHOPAEDIC SURGERY

## 2025-04-03 PROCEDURE — 3074F SYST BP LT 130 MM HG: CPT | Mod: CPTII,S$GLB,, | Performed by: ORTHOPAEDIC SURGERY

## 2025-04-03 PROCEDURE — 3008F BODY MASS INDEX DOCD: CPT | Mod: CPTII,S$GLB,, | Performed by: ORTHOPAEDIC SURGERY

## 2025-04-03 PROCEDURE — 1159F MED LIST DOCD IN RCRD: CPT | Mod: CPTII,S$GLB,, | Performed by: ORTHOPAEDIC SURGERY

## 2025-04-03 NOTE — PROGRESS NOTES
CC: neck pain     Beverley is here today for follow up evaluation of her neck pain. Patient reports her pain is 6/10 today. She notes intermittent pain since her last visit that has been acutely worsening over the past 2 weeks without new mechanism of injury. She gestures to the posterior neck musculature bilaterally when asked where she hurts and denies radicular symptoms, denies numbness/tingling. She admits to using the cranio-cradle on occasion and notes improved symptoms with this.     Recall from visit on 2/06/2022  Beverley is here today for follow up evaluation of her neck pain. Patient reports her pain is 3/10 today. She admits to appreciating approximately 2 to 3 weeks of improvement in her pain with OMT at her most recent visit. She has been somewhat compliant with her HEP and has been using a Craniocradle but would like to review its use today. She gestures to the occiput and trapezius muscle bilaterally when asked where she hurts. She denies new injury or trauma since her last visit.     Recall from visit on 11/02/2022  60 y.o. Female presents today for evaluation of her neck pain. She admits to neck pain for the past several years that has progressively worsened. She has been evaluated for this problem by Dr. Raisa Rubin and had OMT, following which she appreciated several weeks of improvement in her pain following. When asked where she hurts she gestures to the trapezius muscle bilaterally and sweeps her hands to the occiput bilaterally.     How long:  Several years  What makes it better: Rest, OMT, stretching and physical therapy  What makes it worse: Laying down, trying to sleep, turning her head  Does it radiate: Denies  Attempted treatments: She has previously attended physical therapy for this problem and had OMT which has been somewhat helpful. She denies currently taking any medications for this problem and denies history of neck injection/surgery.   Pain score: 5/10  Any mechanical symptoms: Admits to  occasionally appreciating a popping sensation.   Feelings of instability: Denies  Affecting ADLs: Admits to this problem affecting her ability to perform ADLs as it is negatively impacting her ability to sleep.       PAST MEDICAL HISTORY:   Past Medical History:   Diagnosis Date    Breast cancer     Genital herpes, unspecified     Hypertension        PAST SURGICAL HISTORY:   Past Surgical History:   Procedure Laterality Date    BREAST LUMPECTOMY      DILATION AND CURETTAGE OF UTERUS      ENDOMETRIAL ABLATION      FOOT SURGERY         FAMILY HISTORY:   Family History   Problem Relation Name Age of Onset    Breast cancer Paternal Grandmother      Colon cancer Neg Hx      Ovarian cancer Neg Hx         SOCIAL HISTORY:   Social History     Socioeconomic History    Marital status:    Tobacco Use    Smoking status: Never    Smokeless tobacco: Never   Substance and Sexual Activity    Alcohol use: No     Social Drivers of Health     Financial Resource Strain: Low Risk  (11/7/2024)    Overall Financial Resource Strain (CARDIA)     Difficulty of Paying Living Expenses: Not hard at all   Food Insecurity: No Food Insecurity (11/7/2024)    Hunger Vital Sign     Worried About Running Out of Food in the Last Year: Never true     Ran Out of Food in the Last Year: Never true   Transportation Needs: No Transportation Needs (2/1/2023)    [facility] - Transportation     Lack of Transportation (Medical): No     Lack of Transportation (Non-Medical): No   Physical Activity: Unknown (11/7/2024)    Exercise Vital Sign     Days of Exercise per Week: 0 days   Stress: No Stress Concern Present (11/7/2024)    [employer] - Occupational Stress Questionnaire     Feeling of Stress : Only a little   Housing Stability: Unknown (11/7/2024)    Housing Stability Vital Sign     Unable to Pay for Housing in the Last Year: No       MEDICATIONS:     Current Outpatient Medications:     CALCIUM  "ORAL, Take by mouth., Disp: , Rfl:     DULoxetine (CYMBALTA) 30 MG capsule, TK 1 C PO D, Disp: , Rfl: 3    ergocalciferol, vitamin D2, (VITAMIN D ORAL), Take by mouth., Disp: , Rfl:     letrozole (FEMARA) 2.5 mg Tab, Take 2.5 mg by mouth once daily., Disp: , Rfl:     valACYclovir (VALTREX) 500 MG tablet, TAKE 1 TABLET(500 MG) BY MOUTH EVERY DAY, Disp: 30 tablet, Rfl: 11    ALLERGIES:   Review of patient's allergies indicates:  No Known Allergies     PHYSICAL EXAMINATION:  /74   Pulse 74   Ht 5' 5" (1.651 m)   Wt 72 kg (158 lb 11.7 oz)   BMI 26.41 kg/m²   Vitals signs and nursing note have been reviewed.  General: In no acute distress, well developed, well nourished, no diaphoresis  Eyes: EOM full and smooth, no eye redness or discharge  HENT: normocephalic and atraumatic, neck supple, trachea midline, no nasal discharge, no external ear redness or discharge  Cardiovascular: 2+ and symmetric radial bilaterally, no LE edema  Lungs: respirations non-labored, no conversational dyspnea   Abd: non-distended, no rigidity  MSK: no amputation or deformity, no swelling of extremities  Neuro: AAOx3, CN2-12 grossly intact  Skin: No rashes, warm and dry  Psychiatric: cooperative, pleasant, mood and affect appropriate for age    MUSCULOSKELETAL    CERVICAL SPINE    INSPECTION  Anterior head carriage.    Normal cervicothoracolumbar curves.    No obvious pelvic obliquity while standing.    No edema, erythema, or ecchymosis noted in cervical spine or shoulder girdle regions.    No atrophy noted in the upper limbs.    PALPATION  + tenderness to palpation throughout the cervical spine and at the occiput bilaterally  + tenderness over periscapular region or shoulder girdles.    No bony deformities or step offs palpated along the cervical spinous processes.     ROM  Active flexion to 45°.    Active extension to 50°.    Active rotation to 90° bilaterally.    Active sidebending to 45° bilaterally.  Pain present with rotation and " flexion    SPECIAL TESTING  Negative Spurlings test.  Negative Lhermittes test.  Negative shoulder abduction relief sign.  Negative upper limb tension tests    Posture:  Upright and Anterior head carriage  Gait: Non-antalgic     TART (Tissue texture abnormality, Asymmetry,  Restriction of motion and/or Tenderness) changes:    Head: Occipitoatlantal (OA) Joint ES-left, R-right    Strain Patterns: Left sidebending/rotation     Cervical Spine  Thoracic Spine  Lumbar Spine   C1 Neutral T1 Neutral L1 Neutral   C2 Neutral T2 Neutral L2 Neutral   C3 ERSL T3 ERSR L3 Neutral   C4 ERSL T4 NSRRL L4 Neutral   C5 ERSL T5 NSRRL L5 Neutral   C6 Neutral T6 NSRRL     C7 Neutral T7 NSRRL       T8 Neutral       T9 Neutral       T10 Neutral       T11 Neutral       T12 Neutral       Ribs:  Superior rib 1 left  MFR bilateral upper rib cage    Upper Extremity:  Periscapular myofascial restriction bilaterally  HTP the lateral right trapezius muscle    Abdomen:    Pelvis:    Sacrum:    Lower Extremity:      Key   F= Flexed   E = Extended   R = Rotated   N = Neutral   S = Sidebent   TTA = tissue texture abnormality   L/R/B (last letter) = left/right/bilateral   HTP = fascial herniated trigger point   TB = fascial trigger band   CD = fascial continuum distortion   MFR = myofascial restriction       NEUROVASCULAR  Full strength with arm abduction, elbow flexion, wrist extension, elbow extension, finger flexion, and finger abduction.    Sensation intact to light touch in the C5-T1 dermatomes bilaterally.   Normal gait without wide base of support or scissoring.  2+ and symmetric radial and ulnar pulses at the wrists bilaterally.   Capillary refill intact at <2 seconds in all upper limb digits.      IMAGIN. MRI obtained 10/12/2017 due to cervical spine pain   2. MRI images were reviewed personally by me and then directly with patient.  3. FINDINGS: MRI images obtained demonstrate mild cervical spondylosis, no spinal canal stenosis  4.  IMPRESSION: As above.     1. X-ray obtained 09/15/2021 at [facility] due to bilateral neck pain   2. X-ray images were reviewed personally by me and then directly with patient  3. FINDINGS: X-ray images obtained demonstrate trace retrolisthesis at C5 vertebral body.  Increased moderate degenerative disc disease at C5-6 with circumferential osteophyte formation and endplate irregularity.  Mild facet arthropathy at this level as well.  Anterior osteophyte formation and ligamentous ossification at C4-5.  No prominent soft tissue findings detected.  4. IMPRESSION:  Multilevel Cervical spondylosis, most prominent at the C5-6 level.      ASSESSMENT:      ICD-10-CM ICD-9-CM   1. Neck pain  M54.2 723.1   2. Myofascial pain  M79.18 729.1   3. Cervicogenic headache  G44.86 784.0   4. Myalgia  M79.10 729.1   5. Somatic dysfunction of upper extremity  M99.07 739.7   6. Somatic dysfunction of cervical region  M99.01 739.1   7. Cranial somatic dysfunction  M99.00 739.0   8. Somatic dysfunction of thoracic region  M99.02 739.2   9. Somatic dysfunction of rib cage region  M99.08 739.8           PLAN:  1-4.  Neck pain/cervicogenic headache - improved, then deteriorated    - Beverley admits to bilateral trapezius and neck pain for the past several years that has been progressively worsening. She has seen Dr. Raisa Rubin in the past for this and had OMT which was helpful for her.     - She admits to appreciating great improvement in her pain following OMT until approximately 2 weeks ago without new mechanism of injury. She has been using the Craniocradle and found benefit from this historically.     - Based on her description of pain/body language and somatic dysfunction identified on exam, I discussed osteopathic manipulation as a treatment option today. She consents to evaluation and treatment. See below.    - Continue with HEP for neck range of motion, shoulder circles, [place], snow angels, cat/cow prescribed at prior visits.        5-9.   Somatic dysfunction of cervical, cranial, thoracic, ribcage, upper extremity regions -     - OMT 5-6 regions. Oral consent obtained. Reviewed benefits and potential side effects. OMT indicated today due to signs and symptoms as well as local and remote somatic dysfunction findings and their related neurokinetic, lymphatic, fascial and/or arteriovenous body connections. OMT techniques used: Soft Tissue, Myofascial Release, Muscle Energy, Cranial , and Fascial Distortion Model. Treatment was tolerated well. Improvement noted in segmental mobility post-treatment in dysfunctional regions. There were no adverse events and no complications immediately following treatment. Advised plenty of water to help alleviate soreness.      Future planning includes - possibly more OMT if helpful and if indicated, add PT    All questions were answered to the best of my ability and all concerns were addressed at this time.    Follow up as needed.       This note is dictated using the M*Modal Fluency Direct word recognition program. There are word recognition mistakes that are occasionally missed on review.